# Patient Record
Sex: FEMALE | Race: WHITE | NOT HISPANIC OR LATINO | Employment: OTHER
[De-identification: names, ages, dates, MRNs, and addresses within clinical notes are randomized per-mention and may not be internally consistent; named-entity substitution may affect disease eponyms.]

---

## 2017-11-14 ENCOUNTER — RECORDS - HEALTHEAST (OUTPATIENT)
Dept: ADMINISTRATIVE | Facility: OTHER | Age: 67
End: 2017-11-14

## 2017-12-04 ENCOUNTER — AMBULATORY - HEALTHEAST (OUTPATIENT)
Dept: BEHAVIORAL HEALTH | Facility: CLINIC | Age: 67
End: 2017-12-04

## 2017-12-04 DIAGNOSIS — I10 HYPERTENSION: ICD-10-CM

## 2017-12-04 DIAGNOSIS — R41.3 MEMORY LOSS: ICD-10-CM

## 2018-01-05 ENCOUNTER — AMBULATORY - HEALTHEAST (OUTPATIENT)
Dept: BEHAVIORAL HEALTH | Facility: CLINIC | Age: 68
End: 2018-01-05

## 2018-01-05 DIAGNOSIS — I10 HYPERTENSION: ICD-10-CM

## 2018-01-05 DIAGNOSIS — G30.9 MILD NEUROCOGNITIVE DISORDER DUE TO ALZHEIMER'S DISEASE (H): ICD-10-CM

## 2018-01-05 DIAGNOSIS — F06.70 MILD NEUROCOGNITIVE DISORDER DUE TO ALZHEIMER'S DISEASE (H): ICD-10-CM

## 2018-01-05 DIAGNOSIS — R41.3 MEMORY LOSS: ICD-10-CM

## 2019-02-12 ENCOUNTER — AMBULATORY - HEALTHEAST (OUTPATIENT)
Dept: BEHAVIORAL HEALTH | Facility: CLINIC | Age: 69
End: 2019-02-12

## 2019-02-12 DIAGNOSIS — I10 HYPERTENSION: ICD-10-CM

## 2019-02-12 DIAGNOSIS — R41.3 MEMORY LOSS: ICD-10-CM

## 2019-02-12 DIAGNOSIS — G31.84 MCI (MILD COGNITIVE IMPAIRMENT): ICD-10-CM

## 2019-03-25 ENCOUNTER — AMBULATORY - HEALTHEAST (OUTPATIENT)
Dept: BEHAVIORAL HEALTH | Facility: CLINIC | Age: 69
End: 2019-03-25

## 2019-03-25 DIAGNOSIS — G30.9 MILD NEUROCOGNITIVE DISORDER DUE TO ALZHEIMER'S DISEASE (H): ICD-10-CM

## 2019-03-25 DIAGNOSIS — F06.70 MILD NEUROCOGNITIVE DISORDER DUE TO ALZHEIMER'S DISEASE (H): ICD-10-CM

## 2019-03-25 DIAGNOSIS — R41.3 MEMORY LOSS: ICD-10-CM

## 2019-03-25 DIAGNOSIS — G31.84 MCI (MILD COGNITIVE IMPAIRMENT): ICD-10-CM

## 2019-03-25 DIAGNOSIS — I10 HYPERTENSION: ICD-10-CM

## 2020-09-29 ENCOUNTER — AMBULATORY - HEALTHEAST (OUTPATIENT)
Dept: BEHAVIORAL HEALTH | Facility: CLINIC | Age: 70
End: 2020-09-29

## 2020-09-29 DIAGNOSIS — G31.84 MILD COGNITIVE IMPAIRMENT: ICD-10-CM

## 2020-09-29 DIAGNOSIS — R41.3 MEMORY LOSS: ICD-10-CM

## 2021-05-27 NOTE — PROGRESS NOTES
Neuropsychological Evaluation            Name: Beth Ng                          Date of Evaluation: 03/25/2019                  Education: 12 years          Date of Birth (Age): 1950 (69)      REFERRAL QUESTION:   Referred by Reg Jasso MD, for evaluation of cognitive functioning secondary patient complaints of cognitive difficulties and memory problems, repeat evaulation.      This report was prepared by Jean Figueroa, Ph.D., [facility], Board Certified Clinical Neuropsychologist.     BACKGROUND: The following report was taken from an interview with the patient and her family ( and daughter), a review of her existing records, and the current neuropsychological evaluation. Ms. Ng is a 67-year-old, right-handed,  female patient with a medical and psychiatric history that includes hyperlipidemia, hypertension, and arthritis.    The summary and recommendations will be discussed at the beginning of the report, with an extended report and history following, with more detailed results at the end of the document.    SUMMARY AND RECOMMENDATIONS    At this time, Ms. Ng s neuropsychological profile is notable for impairments in free recall memory in a generally amnestic profile. Specifically, she shows deficits in free recall of information, both verbal and visual, with little help from recognition cueing, suggestive of impaired encoding processes. Compared to previous testing, verbal memory was slightly declined to unchanged, while declines were noted in visual memory, some aspects of executive functioning, and semantic retrieval and fluency. During clinical interview, significant difficulties in activities of daily living were generally denied by herself and her partner.     Considering the current evaluation and change from the initial evaluation in 1/2018, a diagnosis of Minor Neurocognitive, Alzheimer s etiology appears likely given prominent amnestic patterns with accompanying  deficits in semantic retrieval and fluency. With report of generally intact i/ADL s, a Mild versus Major diagnosis is still in place, if i/ADLs become impaired, a diagnosis of Major Neurocognitive Disorder should be used.     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        IMPRESSIONS AND RECOMMENDATIONS:    1. During the current assessment, Ms. Ng s performance was notable for free recall deficits as well as some problems with semantic retrieval. This pattern is consistent with what may be expected from am amnestic cognitive profile. Her neurocognitive profile has experienced some declines as noted in the areas of visual memory, executive functioning, and semantic retrieval and fluency.     2. Ms. Ng and her partner will be sent materials regarding compensatory mechanisms.    3. Ms. Ng was encouraged to stay active, both mentally and physically, as this can help individuals maintain cognitive health longer than those who do not remain active.     4. Ms. Ng will be sent materials and resources regarding the local Alzheimer s [employer], which holds resources for individuals suffering from a variety of cognitive complaints. They were encouraged to explore these resources to learn what may be available in their local community should they ever need assistance with care and long-term planning.     5. We discussed the role of medication in diagnoses of cognitive impairment and dementia. If they wish to pursue medication options, they were further encouraged to speak with their primary care provider.     Thank you for including me in the care of this patient.    Jean Figueroa, Ph.D., [facility]  Board Certified Clinical Neuropsychologist  [phone]  [email or web address]    _______________Extended Report_____________________    RELEVANT MEDICAL HISTORY    Mr. Ng was previously seen by this provider for an initial neuropsychological evaluation at the  request of Dr. Jasso. The current evaluation will focus on change in the interval from the previous evaluation. For a more detailed history, please see the initial evaluation.      Briefly, the previous neuropsychological evaluation concluded  At this time Ms. Berenice garcia neuropsychological profile is notable for impairments in free recall memory in a generally amnestic profile. Specifically, she shows deficits in free recall of information, both verbal and visual, with little help from recognition cueing. This suggests impaired encoding processes. In addition, she is showing a pattern of semantic retrieval difficulties, impaired confrontation naming and lowered semantic vs phonemic fluency, that is also characteristic of amnestic profiles and later conversion to dementia in some individuals. Other domains assessed were generally intact. Additionally, at this time, both self and collateral informant report suggest activities of daily living to be intact.      Considering clinical reports and the current neuropsychological profile and observed pattern of deficits, a diagnosis of mild neurocognitive disorder appears warranted at this time. If she begins to experience deficits in other areas of cognitive functioning and difficulties in managing activities of daily living, a diagnosis of Major Neurocognitive Disorder, Alzheimer s Etiology should be considered.     Most recent labs available for review (11/7/2018) are notable for high total cholesterol (228), high triglycerides (167), and high glucose (103).     Please see the electronic record for a full review of Ms. Ng s medical history.    PSYCHOSOCIAL HISTORY  Ms. Ng currently lives with her spouse in [place], [place]. She reported that she graduated from high school and denied the presence of attention or learning difficulties in school.     Regarding family medical history, she reported that the family suspected that her mother had dementia and passed away in her  80 s. Ms. Ng was not aware of when the memory problems started. She also reported that her father suffered from heart disease. She believes one of her uncles may also have had memory problems, but otherwise denied knowledge of other neurological conditions in the family.     Ms. Ng reported that she quit smoking cigarettes more than 15 years ago. She denied the current or past use of illicit drugs. She reported that her current alcohol consumption was  variable  and that she may have a  couple of drinks most nights.     PRESENTING CONCERNS  Ms. Ng presented to the current evaluation reporting that she did not believe that things had changed much over the past 15 months. Her partner reported that she will repeat conversations more often, and ask the same questions more often. He reported that remote memory was intact, but that she has a difficult time with new information. He reported that he has not noticed much of a change in her day-to-day routines around the home, such as cooking, laundry, or cleaning.     Functionally, she is managing her medications/supplements with the aid of a pillbox with no reported problems at home. Her partner did report that they had gone out of town in November of 2018, during which she had significant problems managing her medications and was mixing up dosages. She was also on steroids and opiates for pain at this time for an injury, which possibly contributed to some confusion. She is managing her own finances with no reported issues. She has limited her driving to local places due to not feeling comfortable on the highway or in driving in unfamiliar places. Changes or difficulties in activities of self-care or grooming were denied at this time.     PHYSICAL AND EMOTIONAL FUNCTIONING  Ms. Ng reported that her hearing and vision have been relatively stable in recent years. Weight has been reportedly stable. She reported sleep has been restful and stable in recent  years. She denied any problems with balance or coordination. She denied any problems with headaches. She reported some word-finding problems, but otherwise denied any changes in speech    Emotionally, she reported that her mood has been stable. Her partner agreed and denied any personality changes. She largely denied depression and anxiety. She expressly denied suicidal ideation.     BEHAVIORAL OBSERVATIONS:  Ms. Ng arrived on time for the assessment, accompanied by her partner. She was dressed casually, appropriately groomed, and appeared her stated age.  She ambulated independently and with no obvious sign of gait abnormality. She appeared oriented to person place and time and understood the purpose of the evaluation.  Rate, rhythm, and prosody of speech were within normal limits and language was conversationally intact. No significant stuttering or word-findings problems were observed during interview. Her affect was full and mood appeared euthymic, although at times she did appear to be quite anxious, especially when she felt that she was not performing well on testing. She did repeat herself several times during the testing. There was no behavioral indication of obsessions, hallucinations, or delusional thinking during the evaluation.  The patient was pleasant and cooperative with the examiner.    TESTS ADMINISTERED:  Clinical Interview; Validated measures of effort; Wechsler Adult Intelligence Scale- 4th Edition (WAIS-IV, similarities, coding, symbol search, and digit span subtests only); Penn Verbal Learning Test (HVLT; Form 4); Wechsler Memory Scale - 4th Edition (WMS-IV, Logical Memory); Brief Visuospatial Memory Test (BVMT-R; Form 4); Trail Making Tests A & B (TMT A & B); [place] Naming Test (BNT); Phonemic Fluency (FAS); Semantic Fluency (Animal Naming);  Judgement of Line Orientation; Darren Osterrieth Complex Figure (Copy); Clock Drawing; Dementia Rating Scale - 2nd Edition (DRS-2); Geriatric  Depression Inventory    Billing and Documentation:   Time spent in neurobehavioral exam was approximately 45 minutes for 1 unit of 53128. Time spent administering and scoring tests was approximately 175 minutes (1 unit of 56773, 5 units of 10992). Time spent in integration of patient data, interpretation of standardized test results and clinical data, clinical decision making, treatment planning, report writing, and feedback to patient was approximately 155 minutes (1 unit of 48378, 2 units of 02257) Interview and evaluation were performed on 3/25/2019.    Interpretation   91%+    - Superior  75-90% - High Average  26-74% - Average  16-25% - Low Average  3-15%   - Mildly Impaired  1-2%     - Moderately Impaired  <1%      - Severely Impaired    RESULTS:     MEMORY  On a measure of memory, for verbally presented, word list information, she performed in the mildly impaired range for immediate recall of the information across learning trials with a flat learning curve (5,6,6). Her delayed free recall of the information was severely impaired (recalled 1 word). Her recognition cueing for this information was severely impaired (6/12 correct, 2 false positives). On a measure of verbally presented story information, she performed in the moderately impaired range for immediate recall of the information. Her delayed free recall of this information was severely impaired (recalled one story element across two stories after cueing for each) . Her recognition cueing of this information was well below expectation (11/23 correct). Her recall of visually presented figural information was severely impaired for both immediate and delayed free recall. Her recognition cueing of this information was impaired (2/6 hits, 2 false positives).     ATTENTION/WORKING MEMORY/PROCESSING SPEED  Ms. Berenice garcia performance on measures of graphomotor processing speed was average. Her performance on a measure of speeded visual scanning was superior.  Verbal auditory attention was low average.     VISUOSPATIAL/VISUOCONSTRUCTIONAL  Her performance on a task of visuospatial line judgment was mildly impaired. Her copy of a complex figure was impaired and notable for poor planning and figure aspects out of proportion and misplaced at times. Her clock drawing was mildly impaired and notable for poor number spacing at times, but with correct hand placement.     LANGUAGE  She performed in the mildly impaired range on a measure of confrontation naming. Phonemic fluency was average, while semantic fluency (Animals) was moderately impaired.     EXECUTIVE FUNCTIONING  On a task of speeded visual scanning with set shifting, she performed in the low average range. Her performance on a task of verbal abstract reasoning was low average.     MOOD  On a self-report instrument of symptoms of depression, she endorsed symptoms in the minimal range.     DEMENTIA RATING SCALE  Ms. Ng was administered the Dementia Rating Scale - 2. She performed within normal limits on the Attention, Construction, and Conceptualization scales. Her performance on Initiation/Perseveration was mildly impaired, and her Memory performance was severely impaired.     Herber Ng                      Educ: 12   Age: 69   Sex: F                                       Raw 3/25/2019  TEST                              2018  Raw   SS  WAIS-IV    Similarities                     23    18    8    Digit Span                       23    21    8  WAIS-IV Processing Speed                 22   105    Symbol Search                    16    25   11    Coding                           62    57   11    MEMORY - VERBAL  WMS-IV Logical Memory    Immediate Recall                 14    12    3    Delayed Recall                    0    1     2    Delayed Recognition (/23)        13    11  Penn VLT - R (Form:)                  4    Immediate Recall                 18    18   39T    Delayed Recall                    1    " 1    21T    Delayed Discrimination            6    4    MEMORY - VISUAL  Brief Vis. Mem. Test - R (Form:)         4    Immediate Recall                 10    4    21T    Delayed Recall                    3    1   <20T    Delayed Discrimination            4    2   71-76    LANGUAGE  [place] Naming (60-Item)            46    36   32T    EXECUTIVE FUNCTIONS  Trails: A                          24   21\"   68T  Trails: B                          59   144\"  41T  Phonemic Fluency (COWA)            47    42   54T  Animal Naming                      17    10   28T    VISUOSPATIAL  Clock Draw                              WNL  Judgment of Line Orientation       13    14    6  Darren Copy                                Imp.  Darren Time to Copy                        137\"    MOOD & PERSONALITY  Geriatric Depression Inventory           5    DRS-2                                   Raw  AMSS    Attention                              36   11    Initiation/Perseveration               32    6    Construction                           6    10    Conceptualization                      36   10    Memory                                 14    2  "

## 2021-05-31 ENCOUNTER — RECORDS - HEALTHEAST (OUTPATIENT)
Dept: ADMINISTRATIVE | Facility: CLINIC | Age: 71
End: 2021-05-31

## 2021-06-15 NOTE — PROGRESS NOTES
Neuropsychological Evaluation            Name: Beth Ng                          Date of Evaluation: 01/05/2018                  Education: 12 years          Date of Birth (Age): 1950 (67)      REFERRAL QUESTION:   Referred by Reg Jasso MD, for evaluation of cognitive functioning secondary patient complaints of cognitive difficulties and memory problems.      This report was prepared by Jean Figueroa, Ph.D., [facility], Board Certified Clinical Neuropsychologist.     BACKGROUND: The following report was taken from an interview with the patient and her family ( and daughter), a review of her existing records, and the current neuropsychological evaluation. Ms. Ng is a 67-year-old, right-handed,  female patient with a medical and psychiatric history that includes hyperlipidemia, hypertension, and arthritis.    The results and recommendations will be discussed at the beginning of the report, with an extended report and history following.    RESULTS    Ms. Ng s premorbid cognitive functioning is estimated to be in the low average to average range based on demographic factors and performance on a test of single word reading.     The current examination revealed impaired memory functioning. On a measure of verbally presented, thematically organized story information, she performed in the moderately impaired range for immediate recall of the information and in the severely impaired range for delayed recall of the information (recalled 0 story elements). On recognition cueing for this information, she performed in the moderately to severely impaired range (13/23 correct). On a verbally presented test of word list learning she performed in the mildly impaired range for recall of the word list across learning trials. Her free recall of the word list after a delay was moderately impaired (recalled 1 word. Recognition cueing of the word list was below expectation (9/12 hits, 3 false  positives). On a task of visual memory, she performed in the mildly impaired range for immediate recall of the information across learning trials. Her delayed recall of this information was moderately impaired. Her recognition cueing of the information was slightly improved (5/6 hits, 1 false positive).     She performed in the high average range on measures of speeded visual scanning, speeded visual scanning with set-shifting, and phonemic fluency. She performed in the average range on measures of verbal abstract reasoning, verbal attention, processing speed requiring transcription of numbers and symbols, and semantic fluency. She performed in the low average range on a measure of visuospatial reasoning. She performed in the mildly impaired range on measures of processing speed requiring of matching symbols amidst distractors, confrontation naming, and visuospatial line judgment.     Regarding her neuropsychiatric status, she endorsed minimal symptoms of depression and anxiety on self-report measures.     At this time Ms. Ng s neuropsychological profile is notable for impairments in free recall memory in a generally amnestic profile. Specifically, she shows deficits in free recall of information, both verbal and visual, with little help from recognition cueing. This suggests impaired encoding processes. In addition, she is showing a pattern of semantic retrieval difficulties, impaired confrontation naming and lowered semantic vs phonemic fluency, that is also characteristic of amnestic profiles and later conversion to dementia in some individuals. Other domains assessed were generally intact. Additionally, at this time, both self and collateral informant report suggest activities of daily living to be intact.     Considering clinical reports and the current neuropsychological profile and observed pattern of deficits, a diagnosis of mild neurocognitive disorder appears warranted at this time. If she begins to  experience deficits in other areas of cognitive functioning and difficulties in managing activities of daily living, a diagnosis of Major Neurocognitive Disorder, Alzheimer s Etiology should be considered.     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        IMPRESSIONS AND RECOMMENDATIONS:    1. During the current assessment, Ms. Ng s performance was notable for free recall deficits as well as some problems with semantic retrieval. This pattern is consistent with what may be expected from am amnestic cognitive profile.     2. Ms. Ng and her family were given handouts regarding compensatory cognitive strategies to maximize available cognitive reserve and maximize quality of life. These strategies were also discussed within the feedback session at the end of the evaluation.     3. Ms. Ng was encouraged to stay active, both mentally and physically, as this can help individuals maintain cognitive health longer than those who do not remain active.     4. Ms. Ng reported consuming alcohol most nights of the week, between 2-4 drinks on occasion. It was also reported that her cognition was noticeably worse after several drinks. She was encouraged to decrease her alcohol consumption as heavier levels of consumption may hasten cognitive decline and physical health issues.     5. Ms. Ng and her family were given handouts and resources regarding the local Alzheimer s [employer], which holds resources for individuals suffering from a variety of cognitive complaints. They were encouraged to explore these resources to learn what may be available in their local community should they ever need assistance with care and long-term planning.     6. We discussed the role of medication in diagnoses of cognitive impairment and dementia. If they wish to pursue medication options, they were further encouraged to speak with their primary care provider.     7. The current testing  may act as a baseline of cognitive functioning. It is recommended that she return for an evaluation in 12-18 months  time to assess the stability of her cognitive profile.     8. Feedback about his performances was given to Ms. Ng and her family the same day as his evaluation and no future appointment is necessary at this time. The results and recommendations were explained to them and they were given a chance to ask questions. Contact information was provided should they have questions about the evaluation in the future.     Thank you for including me in the care of this patient.    Jean Figueroa, Ph.D., [facility]-  Board Certified Clinical Neuropsychologist  [phone]  [email or web address]    _______________Extended Report_____________________    RELEVANT MEDICAL HISTORY    Mr. Ng recently presented to the [facility] clinic on 11/14/2017 for complaints of memory problems. The general neurological exam was generally within normal limits although she scored 22/30 on the MMSE at that time.     A recent MRI of the brain (11/14/2017) reported  Mild cerebral and cerebellar atrophy. Mild supratentorial white matter changes that are non-specific, but statistically most likely related to small vessel ischemic disease. Apparent mild thickening of the dura overlying the left cerebral hemisphere. This may present residua from distant inflammatory process or potentially a very thin chronic subdural hematoma.      Chart notes from [facility] indicate that labs were drawn for BMP, CBC, TSH, B12, and sedimentation rate, with no labs in a range suggestive of cognitive impairment from a reversible cause.     Please see the electronic record for a full review of Ms. Ng s medical history.    PSYCHOSOCIAL HISTORY  Ms. Ng currently lives with her spouse in [place], [place]. She reported that she graduated from high school and denied the presence of attention or learning difficulties in school. 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     Regarding family medical history, she reported that the family suspected that her mother had dementia and passed away in her 80 s. Ms. Ng was not aware of when the memory problems started. She also reported that her father suffered from heart disease. She believes one of her uncles may also have had memory problems, but otherwise denied knowledge of other neurological conditions in the family.     Ms. Ng reported that she quit smoking cigarettes approximately 15 years ago. She denied the current or past use of illicit drugs. She reported that she drinks 3-4 drinks an evening most days of the week. She reported that about once a month or so she will have 6 or more drinks on an occasion.     PRESENTING CONCERNS  Ms. Ng presented to the current evaluation with a primary complaint of memory and word-finding problems. Specifically, she reported that she has had to write things down much more frequently in order to remember. Both she and her accompanying family members reported that she will often repeat things that she has already said to them throughout the day. She noted some mild problems with misplacing things, but did not feel that it was a big problem. She also reported that she cannot focus for lengths of time, making it difficult to do things such as sit down to read a book. She and her family feel that these issues became noticeable over the last 2-3 years and have slowly been getting worse.     Functionally, she is managing her medications/supplements with the aid of a pillbox with no reported problems. She also reported that she handles the household finances with no reported difficulties. She is still driving, although she has limited her driving somewhat over the past year. She denied any accidents or tickets, but did report that she has to think harder about directions when  she drives now. She denied any changes or difficulties in activities of self-care or grooming. Her  and daughter agreed with the above report regarding I/ADLS.     PHYSICAL AND EMOTIONAL FUNCTIONING  Ms. Ng reported that her hearing and vision have been relatively stable in recent years. Weight has been reportedly stable. She reported that she uses melatonin as a sleep aid, but that sleep has been restful and stable in recent years. She reported some lightheadedness that coincides with a recent medication change but feels that it is a minor problem and denied any falls associated with it. She denied any problems with headaches.     Emotionally, she reported that she is  usually in a pretty good mood.  She largely denied depression and anxiety. She expressly denied suicidal ideation.     BEHAVIORAL OBSERVATIONS:  Ms. Ng arrived on time for the assessment, accompanied by her spouse and daughter. She was dressed casually, appropriately groomed, and appeared his stated age.  She ambulated independently and with no obvious sign of gait abnormality. She appeared oriented to person place and time and understood the purpose of the evaluation.  Rate, rhythm, and prosody of speech were within normal limits and language was conversationally intact. No significant stuttering or word-findings problems were observed during interview. Her affect was full and mood appeared euthymic, although at times she did appear to be quite anxious, especially when she felt that she was not performing well on testing. There was no behavioral indication of obsessions, hallucinations, or delusional thinking during the evaluation.  The patient was pleasant and cooperative with the examiner.    TESTS ADMINISTERED:  Clinical Interview; Validated measures of effort; Wechsler Test of Adult Reading (WTAR); Wechsler Adult Intelligence Scale- 4th Edition (WAIS-IV, block design, similarities, coding, symbol search, and digit span subtests  only); Penn Verbal Learning Test (HVLT; Form 1); Wechsler Memory Scale - 4th Edition (WMS-IV, Logical Memory); Brief Visuospatial Memory Test (BVMT-R; Form 1); Trail Making Tests A & B (TMT A & B); [place] Naming Test (BNT); Phonemic Fluency (FAS); Semantic Fluency (Animal Naming);  Judgement of Line Orientation; Darren Osterrieth Complex Figure (Copy); Sorenson Depression Inventory, Sorenson Anxiety Inventory    Billing and Documentation:   The current evaluation consisted of 1 unit of 31469 spent in neurobehavioral status examination and 8 hours of time spent in chart review, test administration by neuropsychologist, scoring of tests, integration/interpretation, gathering of collateral information, feedback of results, and report writing) for 8 units of 59345.     Interpretation   91%+    - Superior  75-90% - High Average  26-74% - Average  16-25% - Low Average  3-15%   - Mildly Impaired  1-2%     - Moderately Impaired  <1%      - Severely Impaired    Name: Beth Ng               Educ: 12   Age: 67   Sex: F                                    1/5/2018  TEST                             Raw   SS  WAIS-IV    Similarities                    23    9    Block Design                    24    8    Digit Span                      23    9  WAIS-IV Processing Speed          17   92    Symbol Search                   16    6    Coding                          62   11    WTAR                              21   83  WTAR Pred FSIQ                         89    MEMORY - VERBAL  WMS-IV Logical Memory    Immediate Recall                14    4    Delayed Recall                  0     1    Delayed Recognition (/30)       13  Penn VLT - R (Form:)           1    Immediate Recall                18   39T    Delayed Recall                  1    21T    Delayed Discrimination          6    25T    Rec Hits                       9/12    Rec False Pos.                  3    MEMORY - VISUAL  Brief Vis. Mem. Test - R (Form:)  1    Immediate Recall  "               10   30T    Delayed Recall                  3    28T    Delayed Discrimination          4   82-85    LANGUAGE  [place] Naming (60-Item)           46   38T    EXECUTIVE FUNCTIONS  Trails: A                        24\"   62T  Trails: B                        59\"   60T  Phonemic Fluency (FAS)            47   57T  Animal Naming                     17   48T    VISUOSPATIAL  Judgment of Line Orientation      13    MOOD & PERSONALITY  Sorenson Depression Inventory-II      12  Sorenson Anxiety Inventory            9  "

## 2021-09-08 ENCOUNTER — DOCUMENTATION ONLY (OUTPATIENT)
Dept: NEUROLOGY | Facility: CLINIC | Age: 71
End: 2021-09-08

## 2021-09-08 NOTE — PROGRESS NOTES
Neuropsychology Note     Ms. Ng was originally referred for a neuropsychological evaluation on 9/28/2020 by Dr. Jasso. The original order was sent to Jean Figueroa MD, at [facility]. Due to the ongoing COVID-19 pandemic that prevented in-person visits, Dr. Figueroa elected to wait to see Ms. Ng for an in-person evaluation given the nature of the patient's symptoms/history. However, Dr. Figueroa is no longer working for the [facility] system and his wait-list was passed on to our service at [facility]. Now that we are able to accommodate in-person visits again, our schedulers reached out her to schedule an evaluation with us. However, the patient reported that she is no longer interested.      Ms. Ng is welcome to contact our service at any time ([phone]) should she wish to schedule this evaluation in the future.

## 2021-10-16 ENCOUNTER — HEALTH MAINTENANCE LETTER (OUTPATIENT)
Age: 71
End: 2021-10-16

## 2022-01-01 ENCOUNTER — HEALTH MAINTENANCE LETTER (OUTPATIENT)
Age: 72
End: 2022-01-01

## 2023-01-01 ENCOUNTER — HOSPITAL ENCOUNTER (INPATIENT)
Facility: CLINIC | Age: 73
LOS: 6 days | Discharge: NURSING, REHAB OR LONG-TERM CARE | DRG: 640 | End: 2023-04-19
Attending: EMERGENCY MEDICINE | Admitting: HOSPITALIST
Payer: MEDICARE

## 2023-01-01 ENCOUNTER — HEALTH MAINTENANCE LETTER (OUTPATIENT)
Age: 73
End: 2023-01-01

## 2023-01-01 ENCOUNTER — APPOINTMENT (OUTPATIENT)
Dept: CT IMAGING | Facility: CLINIC | Age: 73
DRG: 640 | End: 2023-01-01
Attending: EMERGENCY MEDICINE
Payer: MEDICARE

## 2023-01-01 ENCOUNTER — DOCUMENTATION ONLY (OUTPATIENT)
Dept: OTHER | Facility: CLINIC | Age: 73
End: 2023-01-01

## 2023-01-01 ENCOUNTER — PATIENT OUTREACH (OUTPATIENT)
Dept: CARE COORDINATION | Facility: CLINIC | Age: 73
End: 2023-01-01
Payer: COMMERCIAL

## 2023-01-01 VITALS
OXYGEN SATURATION: 92 % | TEMPERATURE: 97.2 F | DIASTOLIC BLOOD PRESSURE: 68 MMHG | HEART RATE: 58 BPM | WEIGHT: 120.81 LBS | RESPIRATION RATE: 18 BRPM | SYSTOLIC BLOOD PRESSURE: 111 MMHG

## 2023-01-01 DIAGNOSIS — R45.1 AGITATION: Primary | ICD-10-CM

## 2023-01-01 DIAGNOSIS — F02.C11: ICD-10-CM

## 2023-01-01 DIAGNOSIS — E87.0 HYPERNATREMIA: ICD-10-CM

## 2023-01-01 DIAGNOSIS — G30.8: ICD-10-CM
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
## 2023-01-01 DIAGNOSIS — S02.2XXA CLOSED FRACTURE OF NASAL BONE, INITIAL ENCOUNTER: ICD-10-CM

## 2023-01-01 LAB
ALBUMIN UR-MCNC: 10 MG/DL
ANION GAP SERPL CALCULATED.3IONS-SCNC: 17 MMOL/L (ref 5–18)
ANION GAP SERPL CALCULATED.3IONS-SCNC: 17 MMOL/L (ref 5–18)
ANION GAP SERPL CALCULATED.3IONS-SCNC: 8 MMOL/L (ref 5–18)
APPEARANCE UR: CLEAR
BASOPHILS # BLD AUTO: 0 10E3/UL (ref 0–0.2)
BASOPHILS NFR BLD AUTO: 0 %
BILIRUB UR QL STRIP: NEGATIVE
BUN SERPL-MCNC: 35 MG/DL (ref 8–28)
BUN SERPL-MCNC: 45 MG/DL (ref 8–28)
BUN SERPL-MCNC: 46 MG/DL (ref 8–28)
CALCIUM SERPL-MCNC: 8.2 MG/DL (ref 8.5–10.5)
CALCIUM SERPL-MCNC: 8.7 MG/DL (ref 8.5–10.5)
CALCIUM SERPL-MCNC: 8.9 MG/DL (ref 8.5–10.5)
CHLORIDE BLD-SCNC: 104 MMOL/L (ref 98–107)
CHLORIDE BLD-SCNC: 107 MMOL/L (ref 98–107)
CHLORIDE BLD-SCNC: 108 MMOL/L (ref 98–107)
CO2 SERPL-SCNC: 29 MMOL/L (ref 22–31)
CO2 SERPL-SCNC: 30 MMOL/L (ref 22–31)
CO2 SERPL-SCNC: 30 MMOL/L (ref 22–31)
COLOR UR AUTO: YELLOW
CREAT SERPL-MCNC: 0.96 MG/DL (ref 0.6–1.1)
CREAT SERPL-MCNC: 0.98 MG/DL (ref 0.6–1.1)
CREAT SERPL-MCNC: 1.78 MG/DL (ref 0.6–1.1)
CREAT SERPL-MCNC: 1.84 MG/DL (ref 0.6–1.1)
EOSINOPHIL # BLD AUTO: 0.1 10E3/UL (ref 0–0.7)
EOSINOPHIL NFR BLD AUTO: 1 %
ERYTHROCYTE [DISTWIDTH] IN BLOOD BY AUTOMATED COUNT: 15.4 % (ref 10–15)
ERYTHROCYTE [DISTWIDTH] IN BLOOD BY AUTOMATED COUNT: 16.1 % (ref 10–15)
GFR SERPL CREATININE-BSD FRML MDRD: 28 ML/MIN/1.73M2
GFR SERPL CREATININE-BSD FRML MDRD: 29 ML/MIN/1.73M2
GFR SERPL CREATININE-BSD FRML MDRD: 60 ML/MIN/1.73M2
GFR SERPL CREATININE-BSD FRML MDRD: 61 ML/MIN/1.73M2
GLUCOSE BLD-MCNC: 102 MG/DL (ref 70–125)
GLUCOSE BLD-MCNC: 110 MG/DL (ref 70–125)
GLUCOSE BLD-MCNC: 117 MG/DL (ref 70–125)
GLUCOSE UR STRIP-MCNC: NEGATIVE MG/DL
HCT VFR BLD AUTO: 30.6 % (ref 35–47)
HCT VFR BLD AUTO: 38.7 % (ref 35–47)
HGB BLD-MCNC: 11.6 G/DL (ref 11.7–15.7)
HGB BLD-MCNC: 8.5 G/DL (ref 11.7–15.7)
HGB BLD-MCNC: 9.2 G/DL (ref 11.7–15.7)
HGB BLD-MCNC: 9.7 G/DL (ref 11.7–15.7)
HGB UR QL STRIP: NEGATIVE
HYALINE CASTS: 13 /LPF
IMM GRANULOCYTES # BLD: 0 10E3/UL
IMM GRANULOCYTES NFR BLD: 0 %
KETONES UR STRIP-MCNC: NEGATIVE MG/DL
LEUKOCYTE ESTERASE UR QL STRIP: NEGATIVE
LYMPHOCYTES # BLD AUTO: 1 10E3/UL (ref 0.8–5.3)
LYMPHOCYTES NFR BLD AUTO: 12 %
MCH RBC QN AUTO: 30.9 PG (ref 26.5–33)
MCH RBC QN AUTO: 31.1 PG (ref 26.5–33)
MCHC RBC AUTO-ENTMCNC: 30 G/DL (ref 31.5–36.5)
MCHC RBC AUTO-ENTMCNC: 31.7 G/DL (ref 31.5–36.5)
MCV RBC AUTO: 103 FL (ref 78–100)
MCV RBC AUTO: 98 FL (ref 78–100)
MONOCYTES # BLD AUTO: 1.1 10E3/UL (ref 0–1.3)
MONOCYTES NFR BLD AUTO: 13 %
MUCOUS THREADS #/AREA URNS LPF: PRESENT /LPF
NEUTROPHILS # BLD AUTO: 6.6 10E3/UL (ref 1.6–8.3)
NEUTROPHILS NFR BLD AUTO: 74 %
NITRATE UR QL: NEGATIVE
NRBC # BLD AUTO: 0 10E3/UL
NRBC BLD AUTO-RTO: 0 /100
PH UR STRIP: 5 [PH] (ref 5–7)
PLATELET # BLD AUTO: 204 10E3/UL (ref 150–450)
PLATELET # BLD AUTO: 235 10E3/UL (ref 150–450)
POTASSIUM BLD-SCNC: 3.9 MMOL/L (ref 3.5–5)
POTASSIUM BLD-SCNC: 4.3 MMOL/L (ref 3.5–5)
POTASSIUM BLD-SCNC: 4.4 MMOL/L (ref 3.5–5)
RBC # BLD AUTO: 3.12 10E6/UL (ref 3.8–5.2)
RBC # BLD AUTO: 3.75 10E6/UL (ref 3.8–5.2)
RBC URINE: <1 /HPF
SODIUM SERPL-SCNC: 141 MMOL/L (ref 136–145)
SODIUM SERPL-SCNC: 142 MMOL/L (ref 136–145)
SODIUM SERPL-SCNC: 143 MMOL/L (ref 136–145)
SODIUM SERPL-SCNC: 143 MMOL/L (ref 136–145)
SODIUM SERPL-SCNC: 144 MMOL/L (ref 136–145)
SODIUM SERPL-SCNC: 145 MMOL/L (ref 136–145)
SODIUM SERPL-SCNC: 145 MMOL/L (ref 136–145)
SODIUM SERPL-SCNC: 147 MMOL/L (ref 136–145)
SODIUM SERPL-SCNC: 149 MMOL/L (ref 136–145)
SODIUM SERPL-SCNC: 150 MMOL/L (ref 136–145)
SODIUM SERPL-SCNC: 153 MMOL/L (ref 136–145)
SODIUM SERPL-SCNC: 155 MMOL/L (ref 136–145)
SP GR UR STRIP: 1.02 (ref 1–1.03)
SQUAMOUS EPITHELIAL: 1 /HPF
UROBILINOGEN UR STRIP-MCNC: 2 MG/DL
WBC # BLD AUTO: 8.7 10E3/UL (ref 4–11)
WBC # BLD AUTO: 8.8 10E3/UL (ref 4–11)
WBC URINE: 1 /HPF

## 2023-01-01 PROCEDURE — 250N000011 HC RX IP 250 OP 636: Performed by: HOSPITALIST

## 2023-01-01 PROCEDURE — 36415 COLL VENOUS BLD VENIPUNCTURE: CPT | Performed by: HOSPITALIST

## 2023-01-01 PROCEDURE — 82565 ASSAY OF CREATININE: CPT | Performed by: STUDENT IN AN ORGANIZED HEALTH CARE EDUCATION/TRAINING PROGRAM

## 2023-01-01 PROCEDURE — 250N000013 HC RX MED GY IP 250 OP 250 PS 637: Performed by: STUDENT IN AN ORGANIZED HEALTH CARE EDUCATION/TRAINING PROGRAM

## 2023-01-01 PROCEDURE — 250N000013 HC RX MED GY IP 250 OP 250 PS 637: Performed by: INTERNAL MEDICINE

## 2023-01-01 PROCEDURE — 120N000001 HC R&B MED SURG/OB

## 2023-01-01 PROCEDURE — 36415 COLL VENOUS BLD VENIPUNCTURE: CPT | Performed by: EMERGENCY MEDICINE

## 2023-01-01 PROCEDURE — 250N000013 HC RX MED GY IP 250 OP 250 PS 637: Performed by: HOSPITALIST

## 2023-01-01 PROCEDURE — 99207 PR CDG-CUT & PASTE-POTENTIAL IMPACT ON LEVEL: CPT | Performed by: STUDENT IN AN ORGANIZED HEALTH CARE EDUCATION/TRAINING PROGRAM

## 2023-01-01 PROCEDURE — 85025 COMPLETE CBC W/AUTO DIFF WBC: CPT | Performed by: EMERGENCY MEDICINE

## 2023-01-01 PROCEDURE — 84295 ASSAY OF SERUM SODIUM: CPT | Performed by: HOSPITALIST

## 2023-01-01 PROCEDURE — 99291 CRITICAL CARE FIRST HOUR: CPT | Mod: 25

## 2023-01-01 PROCEDURE — 70450 CT HEAD/BRAIN W/O DYE: CPT

## 2023-01-01 PROCEDURE — 99233 SBSQ HOSP IP/OBS HIGH 50: CPT | Performed by: STUDENT IN AN ORGANIZED HEALTH CARE EDUCATION/TRAINING PROGRAM

## 2023-01-01 PROCEDURE — 70486 CT MAXILLOFACIAL W/O DYE: CPT | Mod: MG

## 2023-01-01 PROCEDURE — 99292 CRITICAL CARE ADDL 30 MIN: CPT

## 2023-01-01 PROCEDURE — 36415 COLL VENOUS BLD VENIPUNCTURE: CPT | Performed by: STUDENT IN AN ORGANIZED HEALTH CARE EDUCATION/TRAINING PROGRAM

## 2023-01-01 PROCEDURE — 99222 1ST HOSP IP/OBS MODERATE 55: CPT | Performed by: HOSPITALIST

## 2023-01-01 PROCEDURE — 99223 1ST HOSP IP/OBS HIGH 75: CPT | Performed by: NURSE PRACTITIONER

## 2023-01-01 PROCEDURE — 81001 URINALYSIS AUTO W/SCOPE: CPT | Performed by: INTERNAL MEDICINE

## 2023-01-01 PROCEDURE — 84295 ASSAY OF SERUM SODIUM: CPT | Performed by: STUDENT IN AN ORGANIZED HEALTH CARE EDUCATION/TRAINING PROGRAM

## 2023-01-01 PROCEDURE — 85018 HEMOGLOBIN: CPT | Performed by: STUDENT IN AN ORGANIZED HEALTH CARE EDUCATION/TRAINING PROGRAM

## 2023-01-01 PROCEDURE — 80048 BASIC METABOLIC PNL TOTAL CA: CPT | Performed by: HOSPITALIST

## 2023-01-01 PROCEDURE — 99239 HOSP IP/OBS DSCHRG MGMT >30: CPT | Performed by: STUDENT IN AN ORGANIZED HEALTH CARE EDUCATION/TRAINING PROGRAM

## 2023-01-01 PROCEDURE — 85014 HEMATOCRIT: CPT | Performed by: STUDENT IN AN ORGANIZED HEALTH CARE EDUCATION/TRAINING PROGRAM

## 2023-01-01 PROCEDURE — 258N000003 HC RX IP 258 OP 636: Performed by: HOSPITALIST

## 2023-01-01 PROCEDURE — 80048 BASIC METABOLIC PNL TOTAL CA: CPT | Performed by: EMERGENCY MEDICINE

## 2023-01-01 PROCEDURE — 99232 SBSQ HOSP IP/OBS MODERATE 35: CPT | Performed by: STUDENT IN AN ORGANIZED HEALTH CARE EDUCATION/TRAINING PROGRAM

## 2023-01-01 RX ORDER — OLANZAPINE 7.5 MG/1
7.5 TABLET, FILM COATED ORAL AT BEDTIME
COMMUNITY

## 2023-01-01 RX ORDER — DIVALPROEX SODIUM 125 MG/1
250 TABLET, DELAYED RELEASE ORAL EVERY MORNING
COMMUNITY

## 2023-01-01 RX ORDER — GABAPENTIN 100 MG/1
100 CAPSULE ORAL 3 TIMES DAILY
COMMUNITY

## 2023-01-01 RX ORDER — MEMANTINE HYDROCHLORIDE 5 MG/1
5 TABLET ORAL 2 TIMES DAILY
Status: DISCONTINUED | OUTPATIENT
Start: 2023-01-01 | End: 2023-01-01 | Stop reason: HOSPADM

## 2023-01-01 RX ORDER — OLANZAPINE 2.5 MG/1
2.5 TABLET, FILM COATED ORAL ONCE
Status: COMPLETED | OUTPATIENT
Start: 2023-01-01 | End: 2023-01-01

## 2023-01-01 RX ORDER — DOCUSATE SODIUM 100 MG/1
100 CAPSULE, LIQUID FILLED ORAL 2 TIMES DAILY PRN
Status: DISCONTINUED | OUTPATIENT
Start: 2023-01-01 | End: 2023-01-01 | Stop reason: HOSPADM

## 2023-01-01 RX ORDER — TRAZODONE HYDROCHLORIDE 50 MG/1
25 TABLET, FILM COATED ORAL AT BEDTIME
COMMUNITY
Start: 2023-01-01

## 2023-01-01 RX ORDER — DONEPEZIL HYDROCHLORIDE 10 MG/1
10 TABLET, FILM COATED ORAL AT BEDTIME
Status: DISCONTINUED | OUTPATIENT
Start: 2023-01-01 | End: 2023-01-01 | Stop reason: HOSPADM

## 2023-01-01 RX ORDER — LISINOPRIL 40 MG/1
40 TABLET ORAL DAILY
Status: DISCONTINUED | OUTPATIENT
Start: 2023-01-01 | End: 2023-01-01 | Stop reason: HOSPADM

## 2023-01-01 RX ORDER — ONDANSETRON 2 MG/ML
4 INJECTION INTRAMUSCULAR; INTRAVENOUS EVERY 6 HOURS PRN
Status: DISCONTINUED | OUTPATIENT
Start: 2023-01-01 | End: 2023-01-01 | Stop reason: HOSPADM

## 2023-01-01 RX ORDER — TRAZODONE HYDROCHLORIDE 50 MG/1
50 TABLET, FILM COATED ORAL AT BEDTIME
Status: ON HOLD | COMMUNITY
End: 2023-01-01

## 2023-01-01 RX ORDER — DEXTROSE MONOHYDRATE 50 MG/ML
INJECTION, SOLUTION INTRAVENOUS CONTINUOUS
Status: DISCONTINUED | OUTPATIENT
Start: 2023-01-01 | End: 2023-01-01

## 2023-01-01 RX ORDER — ACETAMINOPHEN 325 MG/1
650 TABLET ORAL EVERY 6 HOURS PRN
Status: DISCONTINUED | OUTPATIENT
Start: 2023-01-01 | End: 2023-01-01 | Stop reason: HOSPADM

## 2023-01-01 RX ORDER — BUSPIRONE HYDROCHLORIDE 10 MG/1
10 TABLET ORAL 3 TIMES DAILY
COMMUNITY

## 2023-01-01 RX ORDER — ACETAMINOPHEN 650 MG/1
650 SUPPOSITORY RECTAL EVERY 6 HOURS PRN
Status: DISCONTINUED | OUTPATIENT
Start: 2023-01-01 | End: 2023-01-01 | Stop reason: HOSPADM

## 2023-01-01 RX ORDER — BUSPIRONE HYDROCHLORIDE 10 MG/1
10 TABLET ORAL 3 TIMES DAILY
Status: DISCONTINUED | OUTPATIENT
Start: 2023-01-01 | End: 2023-01-01 | Stop reason: HOSPADM

## 2023-01-01 RX ORDER — OLANZAPINE 5 MG/1
2.5-5 TABLET ORAL EVERY 8 HOURS PRN
Status: ON HOLD | COMMUNITY
End: 2023-01-01

## 2023-01-01 RX ORDER — TRAZODONE HYDROCHLORIDE 50 MG/1
50 TABLET, FILM COATED ORAL AT BEDTIME
Status: DISCONTINUED | OUTPATIENT
Start: 2023-01-01 | End: 2023-01-01

## 2023-01-01 RX ORDER — LISINOPRIL 40 MG/1
40 TABLET ORAL DAILY
COMMUNITY

## 2023-01-01 RX ORDER — OLANZAPINE 2.5 MG/1
2.5 TABLET, FILM COATED ORAL EVERY 6 HOURS PRN
Status: DISCONTINUED | OUTPATIENT
Start: 2023-01-01 | End: 2023-01-01 | Stop reason: HOSPADM

## 2023-01-01 RX ORDER — FUROSEMIDE 40 MG
40 TABLET ORAL EVERY MORNING
COMMUNITY

## 2023-01-01 RX ORDER — PROCHLORPERAZINE 25 MG
12.5 SUPPOSITORY, RECTAL RECTAL EVERY 12 HOURS PRN
Status: DISCONTINUED | OUTPATIENT
Start: 2023-01-01 | End: 2023-01-01 | Stop reason: HOSPADM

## 2023-01-01 RX ORDER — DIVALPROEX SODIUM 250 MG/1
250 TABLET, DELAYED RELEASE ORAL EVERY MORNING
Status: DISCONTINUED | OUTPATIENT
Start: 2023-01-01 | End: 2023-01-01 | Stop reason: HOSPADM

## 2023-01-01 RX ORDER — OLANZAPINE 5 MG/1
5 TABLET ORAL 2 TIMES DAILY
Status: DISCONTINUED | OUTPATIENT
Start: 2023-01-01 | End: 2023-01-01

## 2023-01-01 RX ORDER — LIDOCAINE 40 MG/G
CREAM TOPICAL
Status: DISCONTINUED | OUTPATIENT
Start: 2023-01-01 | End: 2023-01-01 | Stop reason: HOSPADM

## 2023-01-01 RX ORDER — ESCITALOPRAM OXALATE 10 MG/1
10 TABLET ORAL EVERY MORNING
Status: DISCONTINUED | OUTPATIENT
Start: 2023-01-01 | End: 2023-01-01 | Stop reason: HOSPADM

## 2023-01-01 RX ORDER — ONDANSETRON 4 MG/1
4 TABLET, ORALLY DISINTEGRATING ORAL EVERY 6 HOURS PRN
Status: DISCONTINUED | OUTPATIENT
Start: 2023-01-01 | End: 2023-01-01 | Stop reason: HOSPADM

## 2023-01-01 RX ORDER — TRAZODONE HYDROCHLORIDE 50 MG/1
50 TABLET, FILM COATED ORAL 2 TIMES DAILY PRN
Status: ON HOLD | COMMUNITY
End: 2023-01-01

## 2023-01-01 RX ORDER — DIVALPROEX SODIUM 250 MG/1
500 TABLET, DELAYED RELEASE ORAL AT BEDTIME
Status: DISCONTINUED | OUTPATIENT
Start: 2023-01-01 | End: 2023-01-01 | Stop reason: HOSPADM

## 2023-01-01 RX ORDER — FUROSEMIDE 40 MG
40 TABLET ORAL EVERY MORNING
Status: DISCONTINUED | OUTPATIENT
Start: 2023-01-01 | End: 2023-01-01 | Stop reason: HOSPADM

## 2023-01-01 RX ORDER — OLANZAPINE 5 MG/1
5 TABLET ORAL 2 TIMES DAILY
COMMUNITY

## 2023-01-01 RX ORDER — DONEPEZIL HYDROCHLORIDE 10 MG/1
10 TABLET, FILM COATED ORAL AT BEDTIME
COMMUNITY

## 2023-01-01 RX ORDER — DIVALPROEX SODIUM 125 MG/1
125 CAPSULE, COATED PELLETS ORAL 2 TIMES DAILY
Status: CANCELLED | OUTPATIENT
Start: 2023-01-01

## 2023-01-01 RX ORDER — PROCHLORPERAZINE MALEATE 5 MG
5 TABLET ORAL EVERY 6 HOURS PRN
Status: DISCONTINUED | OUTPATIENT
Start: 2023-01-01 | End: 2023-01-01 | Stop reason: HOSPADM

## 2023-01-01 RX ORDER — OLANZAPINE 5 MG/1
5 TABLET ORAL 2 TIMES DAILY
Status: DISCONTINUED | OUTPATIENT
Start: 2023-01-01 | End: 2023-01-01 | Stop reason: HOSPADM

## 2023-01-01 RX ORDER — CLONIDINE HYDROCHLORIDE 0.1 MG/1
0.1 TABLET ORAL 2 TIMES DAILY
COMMUNITY

## 2023-01-01 RX ORDER — HALOPERIDOL 2 MG/1
2 TABLET ORAL EVERY 6 HOURS PRN
Qty: 14 TABLET | Refills: 0 | Status: SHIPPED | OUTPATIENT
Start: 2023-01-01

## 2023-01-01 RX ORDER — ESCITALOPRAM OXALATE 10 MG/1
10 TABLET ORAL EVERY MORNING
COMMUNITY

## 2023-01-01 RX ORDER — HALOPERIDOL 5 MG/ML
2 INJECTION INTRAMUSCULAR EVERY 6 HOURS PRN
Status: DISCONTINUED | OUTPATIENT
Start: 2023-01-01 | End: 2023-01-01 | Stop reason: HOSPADM

## 2023-01-01 RX ORDER — DIVALPROEX SODIUM 125 MG/1
500 TABLET, DELAYED RELEASE ORAL AT BEDTIME
COMMUNITY

## 2023-01-01 RX ORDER — SODIUM CHLORIDE 9 MG/ML
INJECTION, SOLUTION INTRAVENOUS ONCE
Status: DISCONTINUED | OUTPATIENT
Start: 2023-01-01 | End: 2023-01-01

## 2023-01-01 RX ORDER — MEMANTINE HYDROCHLORIDE 5 MG/1
5 TABLET ORAL 2 TIMES DAILY
COMMUNITY

## 2023-01-01 RX ORDER — GABAPENTIN 100 MG/1
100 CAPSULE ORAL 3 TIMES DAILY
Status: DISCONTINUED | OUTPATIENT
Start: 2023-01-01 | End: 2023-01-01 | Stop reason: HOSPADM

## 2023-01-01 RX ADMIN — DIVALPROEX SODIUM 250 MG: 250 TABLET, DELAYED RELEASE ORAL at 06:27

## 2023-01-01 RX ADMIN — OLANZAPINE 5 MG: 5 TABLET, FILM COATED ORAL at 17:06

## 2023-01-01 RX ADMIN — BUSPIRONE HYDROCHLORIDE 10 MG: 10 TABLET ORAL at 21:03

## 2023-01-01 RX ADMIN — ACETAMINOPHEN 650 MG: 325 TABLET ORAL at 10:38

## 2023-01-01 RX ADMIN — MEMANTINE HYDROCHLORIDE 5 MG: 5 TABLET, FILM COATED ORAL at 08:45

## 2023-01-01 RX ADMIN — DEXTROSE MONOHYDRATE: 50 INJECTION, SOLUTION INTRAVENOUS at 01:17

## 2023-01-01 RX ADMIN — DIVALPROEX SODIUM 250 MG: 250 TABLET, DELAYED RELEASE ORAL at 08:05

## 2023-01-01 RX ADMIN — DIVALPROEX SODIUM 500 MG: 250 TABLET, DELAYED RELEASE ORAL at 21:02

## 2023-01-01 RX ADMIN — BUSPIRONE HYDROCHLORIDE 10 MG: 10 TABLET ORAL at 08:00

## 2023-01-01 RX ADMIN — OLANZAPINE 2.5 MG: 2.5 TABLET, FILM COATED ORAL at 10:38

## 2023-01-01 RX ADMIN — DONEPEZIL HYDROCHLORIDE 10 MG: 10 TABLET, FILM COATED ORAL at 21:27

## 2023-01-01 RX ADMIN — BUSPIRONE HYDROCHLORIDE 10 MG: 10 TABLET ORAL at 21:24

## 2023-01-01 RX ADMIN — TRAZODONE HYDROCHLORIDE 25 MG: 50 TABLET ORAL at 22:14

## 2023-01-01 RX ADMIN — MEMANTINE HYDROCHLORIDE 5 MG: 5 TABLET, FILM COATED ORAL at 08:55

## 2023-01-01 RX ADMIN — OLANZAPINE 7.5 MG: 5 TABLET, FILM COATED ORAL at 21:04

## 2023-01-01 RX ADMIN — MEMANTINE HYDROCHLORIDE 5 MG: 5 TABLET, FILM COATED ORAL at 08:01

## 2023-01-01 RX ADMIN — DIVALPROEX SODIUM 500 MG: 250 TABLET, DELAYED RELEASE ORAL at 21:23

## 2023-01-01 RX ADMIN — ESCITALOPRAM OXALATE 10 MG: 10 TABLET ORAL at 07:02

## 2023-01-01 RX ADMIN — ESCITALOPRAM OXALATE 10 MG: 10 TABLET ORAL at 06:26

## 2023-01-01 RX ADMIN — TRAZODONE HYDROCHLORIDE 50 MG: 50 TABLET ORAL at 21:05

## 2023-01-01 RX ADMIN — MEMANTINE HYDROCHLORIDE 5 MG: 5 TABLET, FILM COATED ORAL at 21:04

## 2023-01-01 RX ADMIN — TRAZODONE HYDROCHLORIDE 25 MG: 50 TABLET ORAL at 21:08

## 2023-01-01 RX ADMIN — GABAPENTIN 100 MG: 100 CAPSULE ORAL at 08:55

## 2023-01-01 RX ADMIN — FUROSEMIDE 40 MG: 40 TABLET ORAL at 08:56

## 2023-01-01 RX ADMIN — OLANZAPINE 2.5 MG: 2.5 TABLET, FILM COATED ORAL at 17:36

## 2023-01-01 RX ADMIN — GABAPENTIN 100 MG: 100 CAPSULE ORAL at 13:03

## 2023-01-01 RX ADMIN — OLANZAPINE 2.5 MG: 2.5 TABLET, FILM COATED ORAL at 09:03

## 2023-01-01 RX ADMIN — DIVALPROEX SODIUM 250 MG: 250 TABLET, DELAYED RELEASE ORAL at 07:02

## 2023-01-01 RX ADMIN — TRAZODONE HYDROCHLORIDE 25 MG: 50 TABLET ORAL at 21:05

## 2023-01-01 RX ADMIN — MEMANTINE HYDROCHLORIDE 5 MG: 5 TABLET, FILM COATED ORAL at 20:55

## 2023-01-01 RX ADMIN — LISINOPRIL 40 MG: 40 TABLET ORAL at 13:22

## 2023-01-01 RX ADMIN — MEMANTINE HYDROCHLORIDE 5 MG: 5 TABLET, FILM COATED ORAL at 08:00

## 2023-01-01 RX ADMIN — GABAPENTIN 100 MG: 100 CAPSULE ORAL at 13:27

## 2023-01-01 RX ADMIN — HALOPERIDOL LACTATE 2 MG: 5 INJECTION, SOLUTION INTRAMUSCULAR at 01:40

## 2023-01-01 RX ADMIN — OLANZAPINE 5 MG: 5 TABLET, FILM COATED ORAL at 08:56

## 2023-01-01 RX ADMIN — DIVALPROEX SODIUM 500 MG: 250 TABLET, DELAYED RELEASE ORAL at 20:53

## 2023-01-01 RX ADMIN — OLANZAPINE 7.5 MG: 5 TABLET, FILM COATED ORAL at 21:24

## 2023-01-01 RX ADMIN — DONEPEZIL HYDROCHLORIDE 10 MG: 10 TABLET, FILM COATED ORAL at 22:14

## 2023-01-01 RX ADMIN — DIVALPROEX SODIUM 500 MG: 250 TABLET, DELAYED RELEASE ORAL at 22:13

## 2023-01-01 RX ADMIN — GABAPENTIN 100 MG: 100 CAPSULE ORAL at 22:14

## 2023-01-01 RX ADMIN — ESCITALOPRAM OXALATE 10 MG: 10 TABLET ORAL at 08:56

## 2023-01-01 RX ADMIN — BUSPIRONE HYDROCHLORIDE 10 MG: 10 TABLET ORAL at 08:55

## 2023-01-01 RX ADMIN — GABAPENTIN 100 MG: 100 CAPSULE ORAL at 13:22

## 2023-01-01 RX ADMIN — BUSPIRONE HYDROCHLORIDE 10 MG: 10 TABLET ORAL at 22:14

## 2023-01-01 RX ADMIN — BUSPIRONE HYDROCHLORIDE 10 MG: 10 TABLET ORAL at 13:50

## 2023-01-01 RX ADMIN — GABAPENTIN 100 MG: 100 CAPSULE ORAL at 20:55

## 2023-01-01 RX ADMIN — ESCITALOPRAM OXALATE 10 MG: 10 TABLET ORAL at 08:04

## 2023-01-01 RX ADMIN — OLANZAPINE 5 MG: 5 TABLET, FILM COATED ORAL at 17:47

## 2023-01-01 RX ADMIN — DONEPEZIL HYDROCHLORIDE 10 MG: 10 TABLET, FILM COATED ORAL at 21:08

## 2023-01-01 RX ADMIN — MEMANTINE HYDROCHLORIDE 5 MG: 5 TABLET, FILM COATED ORAL at 22:14

## 2023-01-01 RX ADMIN — OLANZAPINE 2.5 MG: 2.5 TABLET, FILM COATED ORAL at 11:48

## 2023-01-01 RX ADMIN — ESCITALOPRAM OXALATE 10 MG: 10 TABLET ORAL at 06:36

## 2023-01-01 RX ADMIN — TRAZODONE HYDROCHLORIDE 25 MG: 50 TABLET ORAL at 21:24

## 2023-01-01 RX ADMIN — GABAPENTIN 100 MG: 100 CAPSULE ORAL at 21:05

## 2023-01-01 RX ADMIN — OLANZAPINE 5 MG: 5 TABLET, FILM COATED ORAL at 08:45

## 2023-01-01 RX ADMIN — GABAPENTIN 100 MG: 100 CAPSULE ORAL at 08:56

## 2023-01-01 RX ADMIN — BUSPIRONE HYDROCHLORIDE 10 MG: 10 TABLET ORAL at 08:56

## 2023-01-01 RX ADMIN — ACETAMINOPHEN 650 MG: 325 TABLET ORAL at 17:13

## 2023-01-01 RX ADMIN — MEMANTINE HYDROCHLORIDE 5 MG: 5 TABLET, FILM COATED ORAL at 21:08

## 2023-01-01 RX ADMIN — GABAPENTIN 100 MG: 100 CAPSULE ORAL at 21:08

## 2023-01-01 RX ADMIN — MEMANTINE HYDROCHLORIDE 5 MG: 5 TABLET, FILM COATED ORAL at 21:24

## 2023-01-01 RX ADMIN — TRAZODONE HYDROCHLORIDE 25 MG: 50 TABLET ORAL at 21:00

## 2023-01-01 RX ADMIN — OLANZAPINE 2.5 MG: 2.5 TABLET, FILM COATED ORAL at 13:03

## 2023-01-01 RX ADMIN — LISINOPRIL 40 MG: 40 TABLET ORAL at 08:00

## 2023-01-01 RX ADMIN — GABAPENTIN 100 MG: 100 CAPSULE ORAL at 21:04

## 2023-01-01 RX ADMIN — OLANZAPINE 5 MG: 5 TABLET, FILM COATED ORAL at 21:08

## 2023-01-01 RX ADMIN — OLANZAPINE 5 MG: 5 TABLET, FILM COATED ORAL at 16:19

## 2023-01-01 RX ADMIN — GABAPENTIN 100 MG: 100 CAPSULE ORAL at 07:55

## 2023-01-01 RX ADMIN — MEMANTINE HYDROCHLORIDE 5 MG: 5 TABLET, FILM COATED ORAL at 08:56

## 2023-01-01 RX ADMIN — OLANZAPINE 7.5 MG: 5 TABLET, FILM COATED ORAL at 21:00

## 2023-01-01 RX ADMIN — GABAPENTIN 100 MG: 100 CAPSULE ORAL at 08:45

## 2023-01-01 RX ADMIN — BUSPIRONE HYDROCHLORIDE 10 MG: 10 TABLET ORAL at 16:53

## 2023-01-01 RX ADMIN — BUSPIRONE HYDROCHLORIDE 10 MG: 10 TABLET ORAL at 13:22

## 2023-01-01 RX ADMIN — GABAPENTIN 100 MG: 100 CAPSULE ORAL at 21:24

## 2023-01-01 RX ADMIN — LISINOPRIL 40 MG: 40 TABLET ORAL at 08:01

## 2023-01-01 RX ADMIN — GABAPENTIN 100 MG: 100 CAPSULE ORAL at 13:50

## 2023-01-01 RX ADMIN — OLANZAPINE 5 MG: 5 TABLET, FILM COATED ORAL at 08:55

## 2023-01-01 RX ADMIN — LISINOPRIL 40 MG: 40 TABLET ORAL at 08:45

## 2023-01-01 RX ADMIN — DONEPEZIL HYDROCHLORIDE 10 MG: 10 TABLET, FILM COATED ORAL at 20:54

## 2023-01-01 RX ADMIN — ACETAMINOPHEN 650 MG: 325 TABLET ORAL at 05:44

## 2023-01-01 RX ADMIN — DOCUSATE SODIUM 100 MG: 100 CAPSULE, LIQUID FILLED ORAL at 08:58

## 2023-01-01 RX ADMIN — OLANZAPINE 5 MG: 5 TABLET, FILM COATED ORAL at 17:27

## 2023-01-01 RX ADMIN — OLANZAPINE 7.5 MG: 5 TABLET, FILM COATED ORAL at 22:14

## 2023-01-01 RX ADMIN — BUSPIRONE HYDROCHLORIDE 10 MG: 10 TABLET ORAL at 08:45

## 2023-01-01 RX ADMIN — OLANZAPINE 5 MG: 5 TABLET, FILM COATED ORAL at 08:00

## 2023-01-01 RX ADMIN — OLANZAPINE 2.5 MG: 2.5 TABLET, FILM COATED ORAL at 01:35

## 2023-01-01 RX ADMIN — GABAPENTIN 100 MG: 100 CAPSULE ORAL at 08:00

## 2023-01-01 RX ADMIN — OLANZAPINE 7.5 MG: 5 TABLET, FILM COATED ORAL at 21:05

## 2023-01-01 RX ADMIN — DONEPEZIL HYDROCHLORIDE 10 MG: 10 TABLET, FILM COATED ORAL at 21:03

## 2023-01-01 RX ADMIN — BUSPIRONE HYDROCHLORIDE 10 MG: 10 TABLET ORAL at 13:27

## 2023-01-01 RX ADMIN — DIVALPROEX SODIUM 250 MG: 250 TABLET, DELAYED RELEASE ORAL at 08:56

## 2023-01-01 RX ADMIN — BUSPIRONE HYDROCHLORIDE 10 MG: 10 TABLET ORAL at 21:08

## 2023-01-01 RX ADMIN — OLANZAPINE 5 MG: 5 TABLET, FILM COATED ORAL at 07:56

## 2023-01-01 RX ADMIN — OLANZAPINE 2.5 MG: 2.5 TABLET, FILM COATED ORAL at 06:33

## 2023-01-01 RX ADMIN — OLANZAPINE 2.5 MG: 2.5 TABLET, FILM COATED ORAL at 22:13

## 2023-01-01 RX ADMIN — DEXTROSE MONOHYDRATE: 50 INJECTION, SOLUTION INTRAVENOUS at 11:41

## 2023-01-01 RX ADMIN — OLANZAPINE 2.5 MG: 2.5 TABLET, FILM COATED ORAL at 21:31

## 2023-01-01 RX ADMIN — DEXTROSE MONOHYDRATE: 50 INJECTION, SOLUTION INTRAVENOUS at 22:39

## 2023-01-01 RX ADMIN — LISINOPRIL 40 MG: 40 TABLET ORAL at 08:56

## 2023-01-01 RX ADMIN — HALOPERIDOL LACTATE 2 MG: 5 INJECTION, SOLUTION INTRAMUSCULAR at 14:36

## 2023-01-01 RX ADMIN — GABAPENTIN 100 MG: 100 CAPSULE ORAL at 14:19

## 2023-01-01 RX ADMIN — BUSPIRONE HYDROCHLORIDE 10 MG: 10 TABLET ORAL at 20:54

## 2023-01-01 RX ADMIN — HALOPERIDOL LACTATE 2 MG: 5 INJECTION, SOLUTION INTRAMUSCULAR at 18:00

## 2023-01-01 ASSESSMENT — ACTIVITIES OF DAILY LIVING (ADL)
ADLS_ACUITY_SCORE: 53
ADLS_ACUITY_SCORE: 48
TOILETING: 1-->ASSISTANCE (EQUIPMENT/PERSON) NEEDED
ADLS_ACUITY_SCORE: 53
ADLS_ACUITY_SCORE: 35
ADLS_ACUITY_SCORE: 47
ADLS_ACUITY_SCORE: 41
ADLS_ACUITY_SCORE: 53
ADLS_ACUITY_SCORE: 53
ADLS_ACUITY_SCORE: 47
ADLS_ACUITY_SCORE: 53
TOILETING_ISSUES: YES
ADLS_ACUITY_SCORE: 47
ADLS_ACUITY_SCORE: 53
ADLS_ACUITY_SCORE: 47
ADLS_ACUITY_SCORE: 41
ADLS_ACUITY_SCORE: 49
ADLS_ACUITY_SCORE: 35
ADLS_ACUITY_SCORE: 53
ADLS_ACUITY_SCORE: 53
ADLS_ACUITY_SCORE: 47
TOILETING: 1-->ASSISTANCE (EQUIPMENT/PERSON) NEEDED (NOT DEVELOPMENTALLY APPROPRIATE)
ADLS_ACUITY_SCORE: 45
ADLS_ACUITY_SCORE: 41
TOILETING_ASSISTANCE: TOILETING DIFFICULTY, ASSISTANCE 1 PERSON
ADLS_ACUITY_SCORE: 47
ADLS_ACUITY_SCORE: 53
ADLS_ACUITY_SCORE: 41
ADLS_ACUITY_SCORE: 53
ADLS_ACUITY_SCORE: 48
ADLS_ACUITY_SCORE: 49
WALKING_OR_CLIMBING_STAIRS_DIFFICULTY: YES
ADLS_ACUITY_SCORE: 53
ADLS_ACUITY_SCORE: 49
DRESS: 1-->ASSISTANCE (EQUIPMENT/PERSON) NEEDED (NOT DEVELOPMENTALLY APPROPRIATE)
ADLS_ACUITY_SCORE: 53
ADLS_ACUITY_SCORE: 48
ADLS_ACUITY_SCORE: 53
ADLS_ACUITY_SCORE: 35
ADLS_ACUITY_SCORE: 54
TRANSFERRING: 1-->ASSISTANCE (EQUIPMENT/PERSON) NEEDED
DEPENDENT_IADLS:: CLEANING;COOKING;LAUNDRY;SHOPPING;MEAL PREPARATION;MEDICATION MANAGEMENT;MONEY MANAGEMENT;TRANSPORTATION
ADLS_ACUITY_SCORE: 47
ADLS_ACUITY_SCORE: 53
ADLS_ACUITY_SCORE: 47
ADLS_ACUITY_SCORE: 45
ADLS_ACUITY_SCORE: 53
ADLS_ACUITY_SCORE: 45
DRESSING/BATHING_DIFFICULTY: YES
DOING_ERRANDS_INDEPENDENTLY_DIFFICULTY: YES
WALKING_OR_CLIMBING_STAIRS: AMBULATION DIFFICULTY, ASSISTANCE 1 PERSON
ADLS_ACUITY_SCORE: 48
ADLS_ACUITY_SCORE: 48
ADLS_ACUITY_SCORE: 47
ADLS_ACUITY_SCORE: 41
ADLS_ACUITY_SCORE: 53
ADLS_ACUITY_SCORE: 53
TRANSFERRING: 0-->ASSISTANCE NEEDED (DEVELOPMENTALLY APPROPRIATE)
ADLS_ACUITY_SCORE: 53
ADLS_ACUITY_SCORE: 47
ADLS_ACUITY_SCORE: 41
ADLS_ACUITY_SCORE: 51
FALL_HISTORY_WITHIN_LAST_SIX_MONTHS: YES
ADLS_ACUITY_SCORE: 48
ADLS_ACUITY_SCORE: 53
ADLS_ACUITY_SCORE: 41
ADLS_ACUITY_SCORE: 53
ADLS_ACUITY_SCORE: 53
ADLS_ACUITY_SCORE: 54
DRESSING/BATHING: BATHING DIFFICULTY, ASSISTANCE 1 PERSON
DIFFICULTY_EATING/SWALLOWING: NO
ADLS_ACUITY_SCORE: 48
ADLS_ACUITY_SCORE: 47
ADLS_ACUITY_SCORE: 49
ADLS_ACUITY_SCORE: 53
ADLS_ACUITY_SCORE: 53
ADLS_ACUITY_SCORE: 44
CONCENTRATING,_REMEMBERING_OR_MAKING_DECISIONS_DIFFICULTY: YES
CHANGE_IN_FUNCTIONAL_STATUS_SINCE_ONSET_OF_CURRENT_ILLNESS/INJURY: NO
DRESS: 1-->ASSISTANCE (EQUIPMENT/PERSON) NEEDED
ADLS_ACUITY_SCORE: 51
ADLS_ACUITY_SCORE: 49
ADLS_ACUITY_SCORE: 47
ADLS_ACUITY_SCORE: 48
ADLS_ACUITY_SCORE: 49
ADLS_ACUITY_SCORE: 41
ADLS_ACUITY_SCORE: 53
ADLS_ACUITY_SCORE: 54
ADLS_ACUITY_SCORE: 53
ADLS_ACUITY_SCORE: 53
BATHING: 1-->ASSISTANCE NEEDED
ADLS_ACUITY_SCORE: 44
ADLS_ACUITY_SCORE: 47
ADLS_ACUITY_SCORE: 47
WEAR_GLASSES_OR_BLIND: NO

## 2023-04-13 PROBLEM — E87.0 HYPERNATREMIA: Status: ACTIVE | Noted: 2023-01-01
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
## 2023-04-13 PROBLEM — S02.2XXA CLOSED FRACTURE OF NASAL BONE, INITIAL ENCOUNTER: Status: ACTIVE | Noted: 2023-01-01

## 2023-04-13 PROBLEM — F02.C11: Status: ACTIVE | Noted: 2023-01-01

## 2023-04-13 PROBLEM — G30.8: Status: ACTIVE | Noted: 2023-01-01
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
## 2023-04-13 NOTE — PLAN OF CARE
Goal Outcome Evaluation:      Problem: Plan of Care - These are the overarching goals to be used throughout the patient stay.    Goal: Plan of Care Review  Description: The Plan of Care Review/Shift note should be completed every shift.  The Outcome Evaluation is a brief statement about your assessment that the patient is improving, declining, or no change.  This information will be displayed automatically on your shift note.  Outcome: Progressing     Patient has no complaints of pain. Vitals stable.  Pt appears to be sleeping comfortably between cares.   Agitated earlier in shift, prior to writer assuming care of pt and received Haldol prn.   Effective in decreasing/resolving agitation.   Patient continues to have elevated Sodium, 153 this am.

## 2023-04-13 NOTE — PROGRESS NOTES
Pt lethargic but somewhat arousalble. Mumbles and grimaces then falls back a sleep. Not alert enough for PO intake. Small incontience episodes. Straight cathed for 870 ml. UA sent. IVF infusing. 1:1 at bedside for safety.     Report given to PATRIZIA Tucker

## 2023-04-13 NOTE — PROGRESS NOTES
Confused. VSS. 1:1 attendant in room. Refusing pills, calm. Resting.patient transferred to room 226 at 1715 on a cart with two nursing assistants transporting. Report given to Katelin ACHARYA

## 2023-04-13 NOTE — CONSULTS
"Care Management Initial Consult    General Information  Assessment completed with: Children, daughter (states she is POA and HCD) Merary via phone  Type of CM/SW Visit: Initial Assessment    Primary Care Provider verified and updated as needed: Yes (CM updated in Epic)   Readmission within the last 30 days: unable to assess (Was at [facility] in [place] 3/7-3/31 and D/C'd to new [facility] ([facility]))         Advance Care Planning: Advance Care Planning Reviewed:  (daughter states has HCD and that she is HCA- states CM can ask [facility] if able to fax copy to CM)        Communication Assessment  Patient's communication style: spoken language (English or Bilingual)        Cognitive  Cognitive/Neuro/Behavioral: not assessed by CM as patient is currently sleeping, calm with 1:1 at bedside for safety    Living Environment:   People in home: facility resident     Current living Arrangements: assisted living  Name of Facility: [facility] in [place]   Able to return to prior arrangements: yes     Family/Social Support:  Care provided by: self, child(marcial) ([facility] staff)  Provides care for: no one  Marital Status:   Children, Significant Other (daughter \"Merary\", son \"Alberto\", S/O \"Chad\". [facility] staff. Friend Kera)       S/O Chad (been together 20 years and lived together until her recent move into [facility])  Description of Support System: Supportive, Involved         Current Resources:   Patient receiving home care services: No  Community Resources:  ([facility]ek[facility] Provider with [facility])  Equipment/supplies currently used at home: Incontinence Supplies (Rx glasses)    Employment/Financial:  Employment Status: retired     Employment/ Comments: no  connection  Financial Concerns: No concerns identified   Referral to Financial Worker: No     Lifestyle & Psychosocial Needs:  Social Determinants of Health     Tobacco Use: Not on file   Alcohol Use: Not on file   Financial Resource Strain: Not on file " "  Food Insecurity: Not on file   Transportation Needs: Not on file   Physical Activity: Not on file   Stress: Not on file   Social Connections: Not on file   Intimate Partner Violence: Not on file   Depression: Not on file   Housing Stability: Not on file     Functional Status:  Prior to admission patient needed assistance:   Dependent ADLs:: Bathing, Dressing, Incontinence, Toileting  Dependent IADLs:: Cleaning, Cooking, Laundry, Shopping, Meal Preparation, Medication Management, Money Management, Transportation ([facility] assists with ADL's as needed. Daughter handles finances. Daughter, son or S/O help with transportation.)  Assesssment of Functional Status: At functional baseline    Mental Health Status:  Mental Health Status:  (hx of delusional disorder and dementia since 2020. Had 3 week geripsych admission in February 2020. Has new [facility]iatrist that started in past two weeks.)       Chemical Dependency Status:  Chemical Dependency Status: No Current Concerns           Values/Beliefs:  Spiritual, Cultural Beliefs, Anabaptist Practices, Values that affect care: no             Additional Information:  Chart reviewed.     CM spoke to sky Reagan via phone; assessment completed.     Just moved into [facility] at discharge from [facility] on 3/31/2023.    03-3/31 2023  [facility] admission: per daughter this was secondary to previous [facility] unwilling to accept patient back (due to behaviors/agitation) and new [facility] needing to be found.     February 2023  Per daughter, patient had a 3 week IP geriatric psych admission at [facility] due to behaviors and agitation and discharged to new [facility]    Had been living with her S/O Chad prior to going into [facility].     Daughter Merary requesting psychiatric stabilization prior to patient returning to [facility] (\"she has had very odd behaviors, she is a danger to herself and others right now, she " "needs medication changes or something to calm her down before going back\"). CM sent text page to MD alerting of daughters request.     Daughter will transport back to [facility].     [facility] in [place] ([facility])  Phone: [phone]  Fax: [phone]  Sonia, Nurse/Director of Health and Wellness [phone]  [facility] uses [facility] in [place] for any new Rx at hospital discharge.   CM spoke to Sonia, who confirms patient resides in [facility]. Indicate patient will need psychiatric stabilization prior to return to facility. Patient gets hourly safety checks, toilet and incontinence every two hours, reminders for activities, independent with ambulation, assist of 1 for bathing (can be resistant at times), behavior management, reminders for dental care, assist of 1 for dressing (can be resistant at times), assist of 1 for grooming (can be resistant at times), escorts to dining and cut up foods to finger size foods. They do laundry and handle medication administration.   [facility] Nurse states patient \"has barely slept since getting here two weeks ago, also up and moving, none of the medications seem to be working\". Report [facility] recently started seeing patient to assist in psychiatric medication management.     [facility] Nurse faxed CM copy of HCD; CM scan/email copy to [employer] requesting ACP Review to validate and updated in Epic and CM also placed paper copy in paper chart.   7:33 PM  Zi Kumar validated HCD and updated in Epic.     Reema Alvarez RN        "

## 2023-04-13 NOTE — PHARMACY-ADMISSION MEDICATION HISTORY
Pharmacy Intern Admission Medication History    Admission medication history is complete. The information provided in this note is only as accurate as the sources available at the time of the update.    Medication reconciliation/reorder completed by provider prior to medication history? Yes    Information Source(s): Facility ([facility]/) medication list/MAR via N/A      Allergies reviewed with patient and updates made in EHR: yes    Medication History Completed By: Jonathon Sesay 4/13/2023 9:45 AM    PTA Med List   Medication Sig Last Dose     busPIRone (BUSPAR) 10 MG tablet Take 10 mg by mouth 3 times daily 4/12/2023 at pm     cloNIDine (CATAPRES) 0.1 MG tablet Take 0.1 mg by mouth 2 times daily 4/12/2023 at pm     divalproex sodium delayed-release (DEPAKOTE) 125 MG DR tablet Take 250 mg by mouth every morning 4/12/2023 at am     divalproex sodium delayed-release (DEPAKOTE) 125 MG DR tablet Take 500 mg by mouth At Bedtime 4/12/2023 at pm     donepezil (ARICEPT) 10 MG tablet Take 10 mg by mouth At Bedtime 4/12/2023 at pm     escitalopram (LEXAPRO) 10 MG tablet Take 10 mg by mouth every morning 4/12/2023 at am     furosemide (LASIX) 40 MG tablet Take 40 mg by mouth every morning 4/12/2023 at am     gabapentin (NEURONTIN) 100 MG capsule Take 100 mg by mouth 3 times daily 4/12/2023 at pm     lisinopril (ZESTRIL) 40 MG tablet Take 40 mg by mouth daily 4/12/2023 at am     melatonin 5 MG tablet Take 5 mg by mouth At Bedtime 4/12/2023 at pm     memantine (NAMENDA) 5 MG tablet Take 5 mg by mouth 2 times daily 4/12/2023 at pm     OLANZapine (ZYPREXA) 5 MG tablet Take 5 mg by mouth 2 times daily 4/12/2023 at pm     OLANZapine (ZYPREXA) 5 MG tablet Take 2.5-5 mg by mouth every 8 hours as needed Unknown at prn     OLANZapine (ZYPREXA) 7.5 MG tablet Take 7.5 mg by mouth At Bedtime 4/12/2023 at pm     traZODone (DESYREL) 50 MG tablet Take 50 mg by mouth At Bedtime 4/12/2023 at pm     traZODone (DESYREL) 50 MG tablet Take 50 mg by  mouth 2 times daily as needed for sleep Unknown at prn
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
## 2023-04-13 NOTE — PROGRESS NOTES
Patient seen and examined.  He can barely converse after the medication that she was given for agitation last night.  She is grimaces to pain when I touched the nose bridge that is bruised.  According to the daughter patient was in general psych unit recently and she requested that patient be seen by psychiatry.      At admission sodium was 156 and is trending down.  Now at 148.  She is receiving dextrose 5 at 50 cc/h.    Labs, medications reviewed.  Discontinue some of her antidepressant and antipsychotic medications.  We will continue to monitor sodium level.  We will check urinalysis to make sure that she does not have UTI.  RN may straight cath to obtain urine specimen for urinalysis.  There is an order and has not been collected from yesterday.

## 2023-04-14 NOTE — PLAN OF CARE
Problem: Plan of Care - These are the overarching goals to be used throughout the patient stay.    Goal: Optimal Comfort and Wellbeing  Outcome: Progressing  Problem: Electrolyte Imbalance  Goal: Electrolyte Imbalance: Plan of Care  Outcome: Progressing   Goal Outcome Evaluation:  Pt restless and agitated upon arrival to unit, disoriented x4, uncooperative and unable to re-oriented, attempted to get OOB multiple times, walked in room, 1:1 at bedside for patient safety, prn Zyprexa given with minimal help, IV haldol given, was effective, continue 1:1, D5 infusing continuously, Na+ check q4h, MD notified and aware, daughter Merary updated, continue to monitor.

## 2023-04-14 NOTE — PROGRESS NOTES
"CLINICAL NUTRITION SERVICES - ASSESSMENT NOTE     Nutrition Prescription    RECOMMENDATIONS FOR MDs/PROVIDERS TO ORDER:  None    Malnutrition Status:    Unable to determine due to lack of data    Recommendations already ordered by Registered Dietitian (RD):  Ensure enlive BID    Future/Additional Recommendations:  Monitor oral intake and weight trends     REASON FOR ASSESSMENT  Beth Ng is a/an 73 year old female assessed by the dietitian for Admission Nutrition Risk Screen for unsure of weight loss.     NUTRITION HISTORY  Patient was sleeping when RD tried to visit today.   No nutrition history collected. No weights on file or taken since admit. Unable to determine if patient has lost any weight.   Chart notes that patient took small bites of meals or was unable to eat d/t being asleep. Poor oral intake since admit.     CURRENT NUTRITION ORDERS  Diet: Low Saturated Fat/2400 mg Sodium, no caffeine  Intake/Tolerance: very minimal intake recorded.     LABS  Labs reviewed    MEDICATIONS  Medications reviewed    ANTHROPOMETRICS  Height: 4' 11\" from 1/18/23 office visit  Most Recent Weight: 121lbs/ 55kg - 1/18/23 office visit  IBW: 98 kg  BMI: Normal BMI  Weight History:   Wt Readings from Last 10 Encounters:   No data found for Wt   121lbs/ 55kg - 1/18/23 office visit  Dosing Weight: 55 kg    ASSESSED NUTRITION NEEDS  Estimated Energy Needs: 4008-5094 kcals/day (25 - 30 kcals/kg)  Justification: Maintenance  Estimated Protein Needs: 55-66 grams protein/day (1 - 1.2 grams of pro/kg)  Justification: Maintenance  Estimated Fluid Needs: (1 mL/kcal)   Justification: Maintenance    MALNUTRITION  % Intake: Unable to assess  % Weight Loss: Unable to assess  Subcutaneous Fat Loss: Unable to assess  Muscle Loss: Unable to assess  Fluid Accumulation/Edema: None noted  Malnutrition Diagnosis: Unable to determine due to lack of data    NUTRITION DIAGNOSIS  Inadequate oral intake related to fatigue/ poor appetite as evidenced " by poor oral intake (0% or small bites) reported by RN    INTERVENTIONS  Implementation  Ensure enlive BID    Goals  Patient to consume % of nutritionally adequate meal trays TID, or the equivalent with supplements/snacks.     Monitoring/Evaluation  Monitor oral intake and weight trends  Amy Sigala RDN, LD

## 2023-04-14 NOTE — PROGRESS NOTES
[facility]    Medicine Progress Note - Hospitalist Service    Date of Admission:  4/12/2023    Assessment & Plan     Beth Ng is a 73 year old female who presented with complaints of weakness, fall, facial injury. Medical history is notable for dementia, paranoia, heart failure, hypertension, breast cancer. Initial evaluation revealed unremarkable vital signs, labs notable for sodium 155, creatinine 1.8, hemoglobin 11.6, .  CT facial bones with nasal bone fractures, CT head without contrast negative for bleed.    On hospital day 1 sodium continued to downtrend towards improvement at 150; on 4/14 the sodium is now normalized at 145. Urinalysis checked appears not-infected. Psychiatry has been consulted since admission, pending consult.       Medication reconciliation was completed yesterday, and the trazodone and Zyprexa were continued.  With an imminent weekend and a potential gap prior to an in-person evaluation by psychiatry, will resume her PTA Aricept, Lexapro, Namenda, and Depakote and BuSpar at her PTA doses 4/14/2023 and DECREASE her trazodone by half to 25mg at bedtime given family-daughter reported potential change in behavior after recent trazodone increase at recent hospital stay.     Writer also called and discussed with the daughter who confirmed details including Haldol being efficacious and behavior change possibly after trazodone increased.  Discussed directly with the .     Pt is new to writer 4/14/2023    Assessment and Plan:  Hyponatremia  Initially felt on admission related to cognitive deficits and poor free water intake; has been improving appropriately and now normalized on 4/14  -Can stop the dextrose infusion  -encourage normal po intake  -follow labs      ESTRELLA  Suspect Prerenal, follow creat; admission 1.78; will recheck.  -recheck bmp  -Hold lasix, lisinopril     Alzheimer's dementia  On a regimen of Aricept, Namenda, Lexapro, trazodone,  BuSpar, as well as Zyprexa per med Rec  Chart review:   --1/18/2023 visit with psychiatry: Hydroxyzine was stopped, started on Seroquel, buspirone 50 mg 4 times daily, citalopram 10 mg twice daily, donepezil, and plan was to follow-up  --Last hospitalization with telepsychiatry recommendations:buspirone increased to 10 mg 3 times daily, Seroquel and Risperdal were stopped, trazodone increased to 50 mg at nighttime  Here thus far:   -Zyprexa 5mg po bid and 7.5mg at bedtime and trazodone 50mg qhs resumed on 4/13  -Psychiatry consulted, appreciate input in the near future  -On 4/14, will resume additional other meds to avoid discontinuation syndrome:    -Donepezil 10 mg at nighttime, Lexapro 10 mg in the morning, Depakote 500 mg at nighttime at 250 mg morning, BuSpar 10 mg 3 times daily  -We will decrease trazodone given this was recently increased and reported behavior change thereafter  -REDUCE to 25 mg trazodone at night time  -continue PRN haldol, appears efficacious though monitor for somnolence     Nasal fractures  Tylenol prn, follow up with ENT as outpt     Essential hypertension  Hx of CHF  Prescribed lisinopril, lasix- held 2/2 to ESTRELLA  - may resume this weekend pending BMP       Diet: Combination Diet Low Saturated Fat Na <2400mg Diet, No Caffeine Diet  Snacks/Supplements Adult: Ensure Enlive; With Meals    DVT Prophylaxis: Pneumatic Compression Devices and Ambulate every shift  Pena Catheter: Not present  Lines: None     Cardiac Monitoring: None  Code Status: No CPR- Do NOT Intubate      Clinically Significant Risk Factors          # Hypernatremia: Highest Na = 155 mmol/L in last 2 days, will monitor as appropriate                        Disposition Plan      Expected Discharge Date: 04/15/2023    Discharge Delays: Specialist Consult (enter specialist & decision needed in comments)  Destination: assisted living ([facility] ([facility]            Alexis Ramos MD  Hospitalist Service  [facility]  [facility]  Securely message with Kasia (more info)  Text page via "[employer] Paging/Directory   ______________________________________________________________________    Interval History   - pt was up and about earlier, appeared restless, then fell asleep  - discussed with 1:1 sitter and bedside RN  - pt is very somnolent this AM but arousable  - writer called and discussed with daughter Merary-she confirmed CODE STATUS is DNR/DNI as consistent with advanced directive  -We discussed recent medication changes including increased trazodone, which patient's daughter feels is when the patient started to become potentially more agitated also with some behavior changes and also reported the haldol historically was helpful  -Daughter requested writer discuss with  regarding potential placement recently had an option for geriatric-psychiatry placement at last hospitalization but then patient clinically improved  -Patient daughter is worried about the patient going back and forth between hospitalizations  -Writer called and discussed with  directly    Physical Exam   Vital Signs: Temp: 97.8  F (36.6  C) Temp src: Axillary BP: 124/60 Pulse: 56   Resp: 16 SpO2: 97 % O2 Device: None (Room air)    Weight: 0 lbs 0 oz    General: somnolent, arouses to stimuli  Pulmonary: clear to auscultation bilaterally, normal respiratory effort, on room air, no rales or wheezes or evidence of accessory muscle use  CVS: regular rate and rhythm, no murmurs, rubs, or gallops; no blatant jugular venous distention; no extremity edema and extremities are warm to the touch  GI: soft, nontender, BS+, no rebound or guarding, no conspicuous organomegaly   Neuro: minimally interactive, somnolent but moves extremities of her own volition, sleepy but arousable  Psych: unable to assess     Medical Decision Making       60 MINUTES SPENT BY ME on the date of service doing chart review, history, exam, documentation &  further activities per the note.   Discussed with the bedside nurse, health unit coordinator, and , and at length with the patient's daughter and then again with the      Data     I have personally reviewed the following data over the past 24 hrs:    N/A  \   N/A   / N/A     143 N/A N/A /  N/A   N/A N/A N/A \       Imaging results reviewed over the past 24 hrs:   No results found for this or any previous visit (from the past 24 hour(s)).

## 2023-04-14 NOTE — PROGRESS NOTES
Care Management Follow Up    Length of Stay (days): 1    Expected Discharge Date: 04/15/2023     Concerns to be Addressed:     Discharge planning  Patient plan of care discussed at interdisciplinary rounds: Yes    Anticipated Discharge Disposition: TBD    Additional Information:  10:34 AM  CM called daughter Merary ([phone]) per her request.  Merary's goals for pt would be to stabilize pt and discharge her back to [facility] without need to admit to [facility]/Psych IP.  Merary is agreeable to [facility]/Psych IP if recommended by Psych.      1:46 PM  Akua RNCM ([phone]) from [facility] called, offered assistance as needed.  Update given to Akua; final discharge plans unknown at this time.    CM to update Akua ACHARYACM regarding pt's discharge plans.    Pt continues on 1:1 sitter.  Psych consult is still pending at this time.    CM will continue to follow.    Juan Pablo Suazo RN

## 2023-04-14 NOTE — PLAN OF CARE
Problem: Plan of Care - These are the overarching goals to be used throughout the patient stay.    Goal: Optimal Comfort and Wellbeing  Outcome: Progressing  Intervention: Monitor Pain and Promote Comfort  Recent Flowsheet Documentation  Taken 4/14/2023 0544 by Yolanda Arnold RN  Pain Management Interventions: medication (see MAR)   Goal Outcome Evaluation: Patient D/Ox4. Speaks softly, word salad, some mumbling does not sound slurred. PERRLA. Moving all extremities, but seems to favor R arm/shoulder and saying ow ow ow when the limb is moved. Tylenol given crushed in applesauce, patient took and drank water with no problem. Bruising on R body. Abrasion to face/nose dried and intact. Up with 2 assist, slept well overnight then went to the chair for a bit. D5% for elevated sodium, level 145 this morning from 149 4 hours prior. Straight cath for 600, does not seem to have emptied bladder spontaneously since admission though she did have some incontinence yesterday. Plan pending.

## 2023-04-15 NOTE — PLAN OF CARE
"Goal Outcome Evaluation:         Problem: Plan of Care - These are the overarching goals to be used throughout the patient stay.    Goal: Plan of Care Review  Description: The Plan of Care Review/Shift note should be completed every shift.  The Outcome Evaluation is a brief statement about your assessment that the patient is improving, declining, or no change.  This information will be displayed automatically on your shift note.  Outcome: Progressing     Problem: Plan of Care - These are the overarching goals to be used throughout the patient stay.    Goal: Patient-Specific Goal (Individualized)  Description: You can add care plan individualizations to a care plan. Examples of Individualization might be:  \"Parent requests to be called daily at 9am for status\", \"I have a hard time hearing out of my right ear\", or \"Do not touch me to wake me up as it startles me\".  Outcome: Progressing         Vitals stable. Did not give morning meds this morning due to patient sleeping and already taking meds crushed with applesauce. PRN Zyprexa given x1 with effectiveness. 1:1 sitter maintained. Discharge pending            "

## 2023-04-15 NOTE — PLAN OF CARE
Problem: Plan of Care - These are the overarching goals to be used throughout the patient stay.    Goal: Optimal Comfort and Wellbeing  Outcome: Progressing     Problem: Risk for Delirium  Goal: Improved Behavioral Control  Outcome: Progressing  Intervention: Minimize Safety Risk  Recent Flowsheet Documentation  Taken 4/15/2023 1620 by Keisha Branch RN  Enhanced Safety Measures:  at bedside    Problem: Oral Intake Inadequate  Goal: Improved Oral Intake  Outcome: Progressing      Goal Outcome Evaluation:       Patient is alert , disoriented x 4, less behavioral today, drowsy and sleept most of the shift, awaked for meal and ,meds, no need for PRNs but gave scheduled meds, continues to have a 1:1 sitter, vital signs stable, discharge pending placement.

## 2023-04-15 NOTE — PROGRESS NOTES
[facility]    Medicine Progress Note - Hospitalist Service    Date of Admission:  4/12/2023    Assessment & Plan     Beth Ng is a 73 year old female who presented with complaints of weakness, fall, facial injury. Medical history is notable for dementia, paranoia, heart failure, hypertension, breast cancer. Initial evaluation revealed unremarkable vital signs, labs notable for sodium 155, creatinine 1.8, hemoglobin 11.6, . CT facial bones with nasal bone fractures, CT head without contrast negative for bleed.    On hospital day 1 sodium continued to downtrend towards improvement at 150; on 4/14 the sodium is now normalized at 145. Urinalysis checked appears not-infected. Psychiatry has been consulted since admission, pending consult.       Medication reconciliation initially completed 4/12 with the trazodone and Zyprexa were continued.  With an imminent weekend and a potential gap prior to an in-person evaluation by psychiatry, resumed her PTA Aricept, Lexapro, Namenda, and Depakote and BuSpar at her PTA doses 4/14/2023 and decreased her trazodone by half to 25mg at bedtime given family-daughter reports of potential change in behavior after a recent trazodone increase from prior hospital stay.     Writer also called and discussed with the daughter who confirmed details including Haldol being efficacious and behavior change possibly after trazodone increased.  Discussed directly with the .     Assessment and Plan:    Alzheimer's dementia  On a regimen of Aricept, Namenda, Lexapro, trazodone, BuSpar, as well as Zyprexa per med Rec  Chart review:   --1/18/2023 visit with psychiatry: Hydroxyzine was stopped, started on Seroquel, buspirone 50 mg 4 times daily, citalopram 10 mg twice daily, donepezil, and plan was to follow-up  --Last hospitalization with telepsychiatry recommendations:buspirone increased to 10 mg 3 times daily, Seroquel and Risperdal were stopped,  trazodone increased to 50 mg at nighttime  Here thus far:   -Zyprexa 5mg po bid and 7.5mg at bedtime and trazodone 50mg qhs resumed on 4/13  -Psychiatry consulted, appreciate input in the near future  -On 4/14, resumed additional other meds to avoid discontinuation syndrome:    -Donepezil 10 mg at nighttime, Lexapro 10 mg in the morning, Depakote 500 mg at nighttime at 250 mg morning, BuSpar 10 mg 3 times daily  -We decreased trazodone given this was recently increased and reported behavior change thereafter  -REDUCE to 25 mg trazodone at night time  - all further adjustments defer to psychiatry when able to assess (will Re-order on 4/17 as per protocol)  -continue PRN haldol, appears efficacious though monitor for somnolence  - question if ativan might be helpful given what appears like behaviorally-mediated being withdrawn     Hypernatremia, improved  Initially felt on admission related to cognitive deficits and poor free water intake; has been improving appropriately and now normalized on 4/14  -Can stop the dextrose infusion  -encourage normal po intake  -follow labs      ESTRELLA, improved  Suspect Prerenal, follow creat; admission 1.78; rechecked and normalized 0.93 4/15/2023   -Hold lasix  - can resume lisinopril 4/15/2023       Nasal fractures  Tylenol prn, follow up with ENT as outpt     Essential hypertension  Hx of CHF  Prescribed lisinopril, lasix- held 2/2 to ESTRELLA  - resumed lisinopril 4/15/2023   - hold lasix for now w/ decreased po intake        Diet: Combination Diet Low Saturated Fat Na <2400mg Diet, No Caffeine Diet  Snacks/Supplements Adult: Ensure Enlive; With Meals    DVT Prophylaxis: Pneumatic Compression Devices and Ambulate every shift  Pena Catheter: Not present  Lines: None     Cardiac Monitoring: None  Code Status: No CPR- Do NOT Intubate      Clinically Significant Risk Factors          # Hypernatremia: Highest Na = 150 mmol/L in last 2 days, will monitor as appropriate                     "    Disposition Plan      Expected Discharge Date: 04/18/2023    Discharge Delays: Specialist Consult (enter specialist & decision needed in comments)  Destination: assisted living ([facility] ( [place]))  Discharge Comments: Psych Hong Ramos MD  Hospitalist Service  [facility]  Securely message with [employer] (more info)  Text page via [employer] Paging/Directory   ______________________________________________________________________    Interval History   -No acute interval events  -The patient is resting, received Zyprexa earlier and is more calm  -The patient is more interactive today, behaviorally keeping her eyes closed, she is declining breakfast, but with the nurse prompting and encouragement, she has tolerated meals; she has been calm with the one-to-one sitter  -Current care plans were updated to the patient, who responded with sounds/grunts and did not report any specific concerns or symptoms aside from \"some pain,\" and the bruising on her nose is stable  -Informed her that writer discussed at length with her daughter on the phone   -discussed with  directly and nursing and huc       Physical Exam   Vital Signs: Temp: 97.4  F (36.3  C) Temp src: Oral BP: (!) 140/65 Pulse: 62   Resp: 18 SpO2: 96 % O2 Device: None (Room air)    Weight: 116 lbs 9.97 oz    General: more alert but closing her eyes, minimally interactive  Pulmonary: clear to auscultation bilaterally, normal respiratory effort, on room air, no rales or wheezes or evidence of accessory muscle use  Eyes- shutting her eyes but able to be pried and then moves in her own volitional directions, not following commands appropriately   CVS: regular rate and rhythm, no murmurs, rubs, or gallops; no blatant jugular venous distention; no extremity edema and extremities are warm to the touch  GI: soft, nontender, BS+, no rebound or guarding, no conspicuous organomegaly   Neuro: minimally interactive, " more alert and interactive but appears behaviorally withdrawn and not following directions except occasionally responding to some questions   Psych: withdrawn, flat affect, appears to be behaviorally not appropriately responding- ie spitting eggs out now but earlier was eating with nursing encouragement; answers questions here or there with short responses or grunts but shutting her eyes    Medical Decision Making       55 MINUTES SPENT BY ME on the date of service doing chart review, history, exam, documentation & further activities per the note.   Discussed with the bedside nurse, health unit coordinator, and , and sitter   Data     I have personally reviewed the following data over the past 24 hrs:    N/A  \   N/A   / N/A     142; 142 104 46 (H) /  102   4.4 30 0.98 \       Imaging results reviewed over the past 24 hrs:   No results found for this or any previous visit (from the past 24 hour(s)).

## 2023-04-15 NOTE — PLAN OF CARE
Problem: Plan of Care - These are the overarching goals to be used throughout the patient stay.    Goal: Optimal Comfort and Wellbeing  Outcome: Progressing  Intervention: Monitor Pain and Promote Comfort  Recent Flowsheet Documentation  Taken 4/15/2023 0100 by Trista Weaver RN  Pain Management Interventions: declines   Goal Outcome Evaluation:       Pt. Will rest tonight with minimal rest and sleep interruptions.

## 2023-04-15 NOTE — PLAN OF CARE
Problem: Plan of Care - These are the overarching goals to be used throughout the patient stay.    Goal: Readiness for Transition of Care  Outcome: Progressing     Problem: Risk for Delirium  Goal: Improved Behavioral Control  Outcome: Progressing     VSS. Denies pain. 1:1 sitter maintained. Pt did not void on shift, bladder scanned for 151mL. Awaiting Na recheck for 2200. Bedrest maintained.

## 2023-04-16 NOTE — PROGRESS NOTES
[facility]    Medicine Progress Note - Hospitalist Service    Date of Admission:  4/12/2023    Assessment & Plan     Beth Ng is a 73 year old female who presented with complaints of weakness, fall, facial injury. Medical history is notable for dementia, paranoia, heart failure, hypertension, breast cancer. Initial evaluation revealed unremarkable vital signs, labs notable for sodium 155, creatinine 1.8, hemoglobin 11.6, . CT facial bones with nasal bone fractures, CT head without contrast negative for bleed.    On hospital day 1 sodium continued to downtrend towards improvement at 150; on 4/14 the sodium is now normalized at 145. Urinalysis checked appears not-infected. Psychiatry has been consulted since admission, pending consult.       Medication reconciliation initially completed 4/12 with the trazodone and Zyprexa were continued.  With an imminent weekend and a potential gap prior to an in-person evaluation by psychiatry, resumed her PTA Aricept, Lexapro, Namenda, and Depakote and BuSpar at her PTA doses 4/14/2023 and decreased her trazodone by half to 25mg at bedtime given family-daughter reports of potential change in behavior after a recent trazodone increase from prior hospital stay.     Writer also called and discussed with the daughter who confirmed details including Haldol being efficacious and behavior change possibly after trazodone increased.  Discussed directly with the .     Pending psychiatry input. Clinically responding; more interactive 4/16/2023 and answering questions though not consistently appropriately.     Assessment and Plan:    Alzheimer's dementia  - as above, more interactive noted on 4/16. Psychiatry input pending  On a regimen of Aricept, Namenda, Lexapro, trazodone, BuSpar, as well as Zyprexa per med Rec  Chart review:   --1/18/2023 visit with psychiatry: Hydroxyzine was stopped, started on Seroquel, buspirone 50 mg 4 times daily,  citalopram 10 mg twice daily, donepezil, and plan was to follow-up  --Last hospitalization with telepsychiatry recommendations:buspirone increased to 10 mg 3 times daily, Seroquel and Risperdal were stopped, trazodone increased to 50 mg at nighttime  Here thus far:   -Zyprexa 5mg po bid and 7.5mg at bedtime and trazodone 50mg qhs resumed on 4/13  -Psychiatry consulted, appreciate input in the near future  -On 4/14, resumed additional other meds to avoid discontinuation syndrome:    -Donepezil 10 mg at nighttime, Lexapro 10 mg in the morning, Depakote 500 mg at nighttime at 250 mg morning, BuSpar 10 mg 3 times daily  -We decreased trazodone given this was recently increased and reported behavior change thereafter  -REDUCE to 25 mg trazodone at night time  - all further adjustments defer to psychiatry when able to assess (will Re-order on 4/17 as per protocol)  -continue PRN haldol, appears efficacious though monitor for somnolence  - question if ativan might be helpful given what appears like behaviorally-mediated being withdrawn     Macrocytic anemia  Assessment: On admission noted at 11.6 but the delta 4/16/2023 is significant at 9.2; no signs or symptoms concerning for bleed but question a volume/dilution component and will monitor vs other process. She did have a fall, so may need to scan for a hematoma or fluid collection if further progressing.   Plan:  - follow hgb - recheck in PM and AM  - transfuse if hgb < 7  - consider CT if further dropping     Hypernatremia, improved  Initially felt on admission related to cognitive deficits and poor free water intake; has been improving appropriately and now normalized on 4/14  -Can stop the dextrose infusion  -encourage normal po intake  -follow labs      ESTRELLA, improved  Suspect Prerenal, follow creat; admission 1.78; rechecked and normalized 0.93 4/15/2023   -Hold lasix  - can resume lisinopril 4/15/2023       Nasal fractures  Tylenol prn, follow up with ENT as  outpt     Essential hypertension  Hx of CHF  Prescribed lisinopril, lasix- held 2/2 to ESTRELLA  - resumed lisinopril 4/15/2023   - hold lasix for now w/ decreased po intake    -Likely resume on 4/17       Diet: Combination Diet Low Saturated Fat Na <2400mg Diet, No Caffeine Diet  Snacks/Supplements Adult: Ensure Enlive; With Meals    DVT Prophylaxis: Pneumatic Compression Devices and Ambulate every shift  Pena Catheter: Not present  Lines: None     Cardiac Monitoring: None  Code Status: No CPR- Do NOT Intubate      Clinically Significant Risk Factors                                 Disposition Plan      Expected Discharge Date: 04/18/2023    Discharge Delays: Specialist Consult (enter specialist & decision needed in comments)  Destination: assisted living ([facility] ([facility]  Discharge Comments: Psych Hong Ramos MD  Hospitalist Service  [facility]  Securely message with [employer] (more info)  Text page via [facility] Paging/Directory   ______________________________________________________________________    Interval History   -No acute interval events  - she is noticeably more interactive 4/16/2023 and answering questions though not consistently appropriately.   -The patient is sitting up at edge of bed, tolerating her breakfast with sitter at bedside  -Discussed with bedside nursing, 2 N. MDR rounds in room  - pt remains calm  - care plans expressed to her and updated her that we updated her daughter and pending psychiatry input; pt said okay and wanted to rest after eating breakfast      Physical Exam   Vital Signs: Temp: 97.9  F (36.6  C) Temp src: Axillary BP: 132/64 Pulse: 84   Resp: 18 SpO2: 97 % O2 Device: None (Room air)    Weight: 117 lbs 8.08 oz    General: more alert and sitting at edge of bed, responding to questions though not with consistent responses  Pulmonary: clear to auscultation bilaterally, normal respiratory effort, on room air, no rales or wheezes  or evidence of accessory muscle use  Eyes- shutting her eyes but able to be pried and then moves in her own volitional directions, not following commands appropriately   CVS: regular rate and rhythm, no murmurs, rubs, or gallops; no blatant jugular venous distention; no extremity edema and extremities are warm to the touch  GI: soft, nontender, BS+, no rebound or guarding, no conspicuous organomegaly   Neuro: more interactive (most since admission), moves all extremities of their own volition, no weakness noted and tolerated breakfast, chewing food okay with sitter observing   Psych: flat affect, more responsive, but appears overall still withdrawn though more conversant and cooperative, otherwise difficult to further assess     Medical Decision Making       55 MINUTES SPENT BY ME on the date of service doing chart review, history, exam, documentation & further activities per the note.   Discussed with the bedside nurse, health unit coordinator, and , and sitter   Data     I have personally reviewed the following data over the past 24 hrs:    N/A  \   9.2 (L)   / N/A     N/A N/A N/A /  N/A   N/A N/A N/A \       Imaging results reviewed over the past 24 hrs:   No results found for this or any previous visit (from the past 24 hour(s)).

## 2023-04-16 NOTE — PLAN OF CARE
Problem: Risk for Delirium  Goal: Optimal Coping  Outcome: Progressing     Problem: Risk for Delirium  Goal: Improved Behavioral Control  Outcome: Progressing  Intervention: Minimize Safety Risk  Recent Flowsheet Documentation  Taken 4/16/2023 0030 by Christine Sánchez RN  Enhanced Safety Measures:  at bedside    Disoriented x4. VSS, on RA. Denied any pain. Up A1 to bathroom. PRN Zyprexa given. Patient rested well overnight. Did walk in halls this AM. Discharge pending placement.

## 2023-04-16 NOTE — PLAN OF CARE
Problem: Risk for Delirium  Goal: Improved Behavioral Control  Intervention: Minimize Safety Risk  Recent Flowsheet Documentation  Taken 4/16/2023 0900 by Keisha Branch RN  Enhanced Safety Measures:  at bedside     Problem: Risk for Delirium  Goal: Improved Behavioral Control  Intervention: Minimize Safety Risk  Recent Flowsheet Documentation  Taken 4/16/2023 0900 by Keisha Branch RN  Enhanced Safety Measures:  at bedside     Problem: Plan of Care - These are the overarching goals to be used throughout the patient stay.    Goal: Absence of Hospital-Acquired Illness or Injury  Intervention: Identify and Manage Fall Risk  Recent Flowsheet Documentation  Taken 4/16/2023 0900 by Keisha Branch RN  Safety Promotion/Fall Prevention: activity supervised   Goal Outcome Evaluation:       Patient is alert , disoriented x 4, awake for meals and meds, given PRN Zyprexa and Haldol given x 1 for restless, continues to have a 1:1 sitter at bedside, paced in the hallway and room, vitally stable, good oral intake, voiding spontaneously.

## 2023-04-17 NOTE — PROGRESS NOTES
[facility]    Medicine Progress Note - Hospitalist Service    Date of Admission:  4/12/2023    Assessment & Plan     Beth Ng is a 73 year old female who presented with complaints of weakness, fall, facial injury. Medical history is notable for dementia, paranoia, heart failure, hypertension, breast cancer. Initial evaluation revealed unremarkable vital signs, labs notable for sodium 155, creatinine 1.8, hemoglobin 11.6, . CT facial bones with nasal bone fractures, CT head without contrast negative for bleed.    On hospital day 1 sodium continued to downtrend towards improvement at 150; on 4/14 the sodium is now normalized at 145. Urinalysis checked appears not-infected. Psychiatry has been consulted since admission, pending consult.       Medication reconciliation initially completed 4/12 with the trazodone and Zyprexa were continued.  With an imminent weekend and a potential gap prior to an in-person evaluation by psychiatry, resumed her PTA Aricept, Lexapro, Namenda, and Depakote and BuSpar at her PTA doses 4/14/2023 and decreased her trazodone by half to 25mg at bedtime given family-daughter reports of potential change in behavior after a recent trazodone increase from prior hospital stay.     Writer also called and discussed with the daughter who confirmed details including Haldol being efficacious and behavior change possibly after trazodone increased.  Discussed directly with the .     Pending psychiatry input. Clinically responding; more interactive 4/16/2023 and answering questions though not consistently appropriately. About the same on 4/17/2023, answering some questions correctly.     Assessment and Plan:    Alzheimer's dementia  - as above, more interactive noted on 4/16 and similar on 4/17. Psychiatry input pending  On a regimen of Aricept, Namenda, Lexapro, trazodone, BuSpar, as well as Zyprexa per med Rec  Chart review:   --1/18/2023 visit with  psychiatry: Hydroxyzine was stopped, started on Seroquel, buspirone 50 mg 4 times daily, citalopram 10 mg twice daily, donepezil, and plan was to follow-up  --Last hospitalization with telepsychiatry recommendations:buspirone increased to 10 mg 3 times daily, Seroquel and Risperdal were stopped, trazodone increased to 50 mg at nighttime  Here thus far:   -Zyprexa 5mg po bid and 7.5mg at bedtime and trazodone 50mg qhs resumed on 4/13  -Psychiatry consulted, appreciate input in the near future  -On 4/14, resumed additional other meds to avoid discontinuation syndrome:    -Donepezil 10 mg at nighttime, Lexapro 10 mg in the morning, Depakote 500 mg at nighttime at 250 mg morning, BuSpar 10 mg 3 times daily  -We had decreased trazodone given this was recently increased and reported behavior change thereafter  -REDUCE to 25 mg trazodone at night time  - all further adjustments defer to psychiatry when able to assess (will Re-order on 4/17 as per protocol)  -continue PRN haldol, appears efficacious though monitor for somnolence  - question if ativan might be helpful given what appears like behaviorally-mediated being withdrawn     Macrocytic anemia  Assessment: On admission noted at 11.6 but the delta 4/16/2023 is significant at 9.2; no signs or symptoms concerning for bleed but question a volume/dilution component and will monitor vs other process. She did have a fall, so may need to scan for a hematoma or fluid collection if further progressing.  It is stable at 9.7 on 4/17/2023, possible the first read was not actual baseline or dilution-related.  Plan:  - follow hgb   - transfuse if hgb < 7  - consider CT if further dropping or vitally unstable     Hypernatremia, improved  Initially felt on admission related to cognitive deficits and poor free water intake; has been improving appropriately and now normalized on 4/14  -Can stop the dextrose infusion  -encourage normal po intake  -follow labs      ESTRELLA, improved  Suspect  Prerenal, follow creat; admission 1.78; rechecked and normalized 0.93 4/15/2023   -Hold lasix  -resume lisinopril 4/15/2023       Nasal fractures  Tylenol prn, follow up with ENT as outpt     Essential hypertension  Hx of CHF  Prescribed lisinopril, lasix- held 2/2 to ESTRELLA  - resumed lisinopril 4/15/2023   - hold lasix for now w/ decreased po intake    -Likely resume on 4/17       Diet: Combination Diet Low Saturated Fat Na <2400mg Diet, No Caffeine Diet  Snacks/Supplements Adult: Ensure Enlive; With Meals    DVT Prophylaxis: Pneumatic Compression Devices and Ambulate every shift  Pena Catheter: Not present  Lines: None     Cardiac Monitoring: None  Code Status: No CPR- Do NOT Intubate      Clinically Significant Risk Factors                                 Disposition Plan     Expected Discharge Date: 04/18/2023    Discharge Delays: Specialist Consult (enter specialist & decision needed in comments)  Destination: assisted living ([facility] ([facility]  Discharge Comments: Psych Eval          Alexis Ramos MD  Hospitalist Service  [facility]  Securely message with [employer] (more info)  Text page via [employer] Paging/Directory   ______________________________________________________________________    Interval History   -No acute interval events  - she was noticeably more interactive 4/16/2023 and answering questions though not consistently appropriately-- similar today; answers questions but sometimes no response  - she is calm, and sitter notes she was hyperactive earlier but now resting  - pt denied having new symptoms or pain or breathing issues  - care plans again expressed to her   - discussed w/ charge RN about logistics of psychiatry consult; a triage/liaison seeing/evaluating today      Physical Exam   Vital Signs: Temp: 98  F (36.7  C) Temp src: Axillary BP: (!) 149/68 Pulse: 73   Resp: 20 SpO2: 93 % O2 Device: None (Room air)    Weight: 117 lbs 8.08 oz    General: more alert  and resting in bed; again responding to questions occasionally appropriately   Pulmonary: clear to auscultation bilaterally, normal respiratory effort, on room air, no rales or wheezes or evidence of accessory muscle use  Eyes- shutting her eyes but able to be pried and then moves in her own volitional directions, not following commands appropriately   CVS: regular rate and rhythm, no murmurs, rubs, or gallops; no blatant jugular venous distention; no extremity edema and extremities are warm to the touch  GI: soft, nontender, BS+, no rebound or guarding, no conspicuous organomegaly   Neuro: more interactive starting since 4/16; moves all extremities of their own volition, no weakness noted and tolerated breakfast, chewing food okay with sitter observing   Psych: flat affect, more responsive, but appears overall a bit withdrawn though remains more conversant with prompting and cooperative, otherwise difficult to further assess     Medical Decision Making       52 MINUTES SPENT BY ME on the date of service doing chart review, history, exam, documentation & further activities per the note.   Discussed with the bedside nurse, health unit coordinator, and , and sitter   Data     I have personally reviewed the following data over the past 24 hrs:    8.7  \   9.7 (L)   / 204     N/A N/A N/A /  N/A   N/A N/A N/A \       Imaging results reviewed over the past 24 hrs:   No results found for this or any previous visit (from the past 24 hour(s)).

## 2023-04-17 NOTE — PLAN OF CARE
Problem: Plan of Care - These are the overarching goals to be used throughout the patient stay.    Goal: Absence of Hospital-Acquired Illness or Injury  Intervention: Identify and Manage Fall Risk  Recent Flowsheet Documentation  Taken 4/17/2023 0758 by Juany Mckinley RN  Safety Promotion/Fall Prevention:    supervised activity    safety round/check completed    room organization consistent    room near nurse's station    room door open    nonskid shoes/slippers when out of bed    mobility aid in reach    lighting adjusted    increase visualization of patient    increased rounding and observation    clutter free environment maintained    bedside attendant    assistive device/personal items within reach    activity supervised     Problem: Plan of Care - These are the overarching goals to be used throughout the patient stay.    Goal: Optimal Comfort and Wellbeing  Outcome: Progressing     Problem: Risk for Delirium  Goal: Improved Behavioral Control  Intervention: Prevent and Manage Agitation  Recent Flowsheet Documentation  Taken 4/17/2023 0758 by Juany Mckinley RN  Environment Familiarity/Consistency: (1:1 sitter at bedside) other (see comments)    Goal Outcome Evaluation: Alert with intermittent confusion, compliant, re-orientable, agitation, anxiety, restlessness managed with scheduled and PRN oral Zyprexa, increased activities in the room--ambulation, therapeutic distraction, re-orient to environment, 1:1 sitter. HTN managed with scheduled medication, denied pain, oral intake tolerated well, able to swallow medications crushed and mixed with applesauce. Patient noted sound asleep at this time. Will continue to monitor. -Juany CARLIN RN

## 2023-04-17 NOTE — CONSULTS
"Triage and Transition - Consult and Liaison   [phone]  April 17, 2023      Beth Ng  1950    Plan:     Will ask psychiatry provider to follow up, left message.    Pt with hx of dementia/agitation.    Continue care coordination.    Maintain current transition plan per care team. Next steps include: Psychiatry provider follow-up.        Presenting problem, including what brought patient to hospital: Beth Ng is followed related to \"dementia with sever agitation\"    Reason for consult: Requested by Ramirez Marshall MD .    Reason for inability to complete assessment with patient:  Pt noted to be disoriented x4, with alert to self at times, unable to provide meaningful information at this time.    Historical information:   Per chart review and H&P noting:   Beth Ng is a 73 year old female who presented with complaints of weakness, fall, facial injury. Medical history is notable for dementia, paranoia, heart failure, hypertension, breast cancer.    Pt is noted to be disoriented x4, at times oriented to self only, pleasantly confused, and restless at HS.  Pt with a hx of dementia and a recent fall.  Pt been pacing the hallways, also having good oral intake.      Daughter notes potential change in pt behavior after recent trazodone increase from previous hospital encounter.  Pt with many med adjustments and care team psych med questions, will ask psychiatry provider to follow up with pt, left message, likely 4/18, when she returns.     Medications: Per EHR at time of this note.  Current Facility-Administered Medications   Medication     acetaminophen (TYLENOL) tablet 650 mg    Or     acetaminophen (TYLENOL) Suppository 650 mg     busPIRone (BUSPAR) tablet 10 mg     divalproex sodium delayed-release (DEPAKOTE) DR tablet 250 mg     divalproex sodium delayed-release (DEPAKOTE) DR tablet 500 mg     docusate sodium (COLACE) capsule 100 mg     donepezil (ARICEPT) tablet 10 mg     escitalopram " (LEXAPRO) tablet 10 mg     gabapentin (NEURONTIN) capsule 100 mg     haloperidol lactate (HALDOL) injection 2 mg     lidocaine (LMX4) cream     lidocaine 1 % 0.1-1 mL     lisinopril (ZESTRIL) tablet 40 mg     memantine (NAMENDA) tablet 5 mg     OLANZapine (zyPREXA) tablet 2.5 mg     OLANZapine (zyPREXA) tablet 5 mg     OLANZapine (zyPREXA) tablet 7.5 mg     ondansetron (ZOFRAN ODT) ODT tab 4 mg    Or     ondansetron (ZOFRAN) injection 4 mg     prochlorperazine (COMPAZINE) injection 5 mg    Or     prochlorperazine (COMPAZINE) tablet 5 mg    Or     prochlorperazine (COMPAZINE) suppository 12.5 mg     sodium chloride (PF) 0.9% PF flush 3 mL     sodium chloride (PF) 0.9% PF flush 3 mL     traZODone (DESYREL) half-tab 25 mg     Medications Prior to Admission   Medication Sig Dispense Refill Last Dose     busPIRone (BUSPAR) 10 MG tablet Take 10 mg by mouth 3 times daily   4/12/2023 at pm     cloNIDine (CATAPRES) 0.1 MG tablet Take 0.1 mg by mouth 2 times daily   4/12/2023 at pm     divalproex sodium delayed-release (DEPAKOTE) 125 MG DR tablet Take 250 mg by mouth every morning   4/12/2023 at am     divalproex sodium delayed-release (DEPAKOTE) 125 MG DR tablet Take 500 mg by mouth At Bedtime   4/12/2023 at pm     donepezil (ARICEPT) 10 MG tablet Take 10 mg by mouth At Bedtime   4/12/2023 at pm     escitalopram (LEXAPRO) 10 MG tablet Take 10 mg by mouth every morning   4/12/2023 at am     furosemide (LASIX) 40 MG tablet Take 40 mg by mouth every morning   4/12/2023 at am     gabapentin (NEURONTIN) 100 MG capsule Take 100 mg by mouth 3 times daily   4/12/2023 at pm     lisinopril (ZESTRIL) 40 MG tablet Take 40 mg by mouth daily   4/12/2023 at am     melatonin 5 MG tablet Take 5 mg by mouth At Bedtime   4/12/2023 at pm     memantine (NAMENDA) 5 MG tablet Take 5 mg by mouth 2 times daily   4/12/2023 at pm     OLANZapine (ZYPREXA) 5 MG tablet Take 5 mg by mouth 2 times daily   4/12/2023 at pm     OLANZapine (ZYPREXA) 5 MG  tablet Take 2.5-5 mg by mouth every 8 hours as needed   Unknown at prn     OLANZapine (ZYPREXA) 7.5 MG tablet Take 7.5 mg by mouth At Bedtime   4/12/2023 at pm     traZODone (DESYREL) 50 MG tablet Take 50 mg by mouth At Bedtime   4/12/2023 at pm     traZODone (DESYREL) 50 MG tablet Take 50 mg by mouth 2 times daily as needed for sleep   Unknown at prn       Collateral information:   Reviewed chart and coordinated with psychiatric provider for pt follow up, left message.      SCOT RICHTER Psychotherapist Trainee  Triage and Transition - Consult and Liaison   [phone]

## 2023-04-17 NOTE — PLAN OF CARE
Problem: Risk for Delirium  Goal: Optimal Coping  Outcome: Progressing    A&O to self only, pleasantly confused. Restless before bed. Scheduled meds given; effective. Walked in halls on shift. Denied pain. VSS, on RA. PIV SL. Discharge pending psych eval.

## 2023-04-17 NOTE — PROGRESS NOTES
CLINICAL NUTRITION SERVICES - REASSESSMENT NOTE     Nutrition Prescription    RECOMMENDATIONS FOR MDs/PROVIDERS TO ORDER:  none    Malnutrition Status:    Does not meet criteria     Recommendations already ordered by Registered Dietitian (RD):  discontinue supplements     Future/Additional Recommendations:  Will monitor po      EVALUATION OF THE PROGRESS TOWARD GOALS   Diet: Low Saturated Fat Na <2400mg Diet, No Caffeine Diet; Snacks/Supplements Adult: Ensure Enlive; With Meals, 2 x daily   Intake: 100% of meals   For 4/16: 2200 calories, 119g protein which would be > 100% of estimated needs      NEW FINDINGS   No new findings    ANTHROPOMETRICS  04/16/23 0216 53.3 kg (117 lb 8.1 oz) Bed scale   04/15/23 0500 52.9 kg (116 lb 10 oz)        PHYSICAL FINDINGS  Per flowsheet:  GI-WDL  Skin-no skin breakdown    LABS  Reviewed, BUN-46, Ca-8.2    MEDICATIONS  Reviewed      NUTRITION DIAGNOSIS  Inadequate oral intake related to fatigue/ poor appetite as evidenced by poor oral intake (0% or small bites) reported by RN  -evaluation: met    Goals:  Patient to consume % of nutritionally adequate meal trays TID, or the equivalent with supplements/snacks. -met    INTERVENTIONS  Implementation  Intake appears adequate will discontinue supplements       Monitoring/Evaluation  Progress toward goals will be monitored and evaluated per protocol.

## 2023-04-18 NOTE — PROGRESS NOTES
[facility]    Medicine Progress Note - Hospitalist Service    Date of Admission:  4/12/2023    Assessment & Plan     Beth Ng is a 73 year old female who presented with complaints of weakness, fall, facial injury. Medical history is notable for dementia, paranoia, heart failure, hypertension, breast cancer. Initial evaluation revealed unremarkable vital signs, labs notable for sodium 155, creatinine 1.8, hemoglobin 11.6, . CT facial bones with nasal bone fractures, CT head without contrast negative for bleed.    On hospital day 1 sodium continued to downtrend towards improvement at 150; on 4/14 the sodium is now normalized at 145. Urinalysis checked appears not-infected. Psychiatry has been consulted since admission, pending consult.       Medication reconciliation initially completed 4/12 with the trazodone and Zyprexa were continued.  With an imminent weekend and a potential gap prior to an in-person evaluation by psychiatry, resumed her PTA Aricept, Lexapro, Namenda, and Depakote and BuSpar at her PTA doses 4/14/2023 and decreased her trazodone by half to 25mg at bedtime given family-daughter reports of potential change in behavior after a recent trazodone increase from prior hospital stay.     Writer also called and discussed with the daughter who confirmed details including Haldol being efficacious and behavior change possibly after trazodone increased.  Discussed directly with the .     Pending psychiatry input, seeing 4/18/2023. Clinically responding; more interactive since 4/16/2023 and answering questions though not consistently appropriately. About the same on 4/18/2023, answering some questions correctly.     Assessment and Plan:    Alzheimer's dementia  - as above, more interactive noted on 4/16 and similar on 4/17. Psychiatry input pending  On a regimen of Aricept, Namenda, Lexapro, trazodone, BuSpar, as well as Zyprexa per med Rec  Chart review:    --1/18/2023 visit with psychiatry: Hydroxyzine was stopped, started on Seroquel, buspirone 50 mg 4 times daily, citalopram 10 mg twice daily, donepezil, and plan was to follow-up  --Last hospitalization with telepsychiatry recommendations:buspirone increased to 10 mg 3 times daily, Seroquel and Risperdal were stopped, trazodone increased to 50 mg at nighttime  Here thus far:   -Zyprexa 5mg po bid and 7.5mg at bedtime and trazodone 50mg qhs resumed on 4/13  -Psychiatry consulted, appreciate input in the near future  -On 4/14, resumed additional other meds to avoid discontinuation syndrome:    -Donepezil 10 mg at nighttime, Lexapro 10 mg in the morning, Depakote 500 mg at nighttime at 250 mg morning, BuSpar 10 mg 3 times daily  -We had decreased trazodone given this was recently increased and reported behavior change thereafter  -REDUCE to 25 mg trazodone at night time  - all further adjustments defer to psychiatry when able to assess (will Re-order on 4/17 as per protocol)  -continue PRN haldol, appears efficacious though monitor for somnolence  - question if ativan might be helpful given what appears like behaviorally-mediated being withdrawn   --- psychiatry consulted and seeing 4/18/2023, greatly appreciate    Macrocytic anemia  Assessment: On admission noted at 11.6 but the delta 4/16/2023 is significant at 9.2; no signs or symptoms concerning for bleed but question a volume/dilution component and will monitor vs other process. She did have a fall, so may need to scan for a hematoma or fluid collection if further progressing.  It is stable at 9.7 on 4/17/2023, possible the first read was not actual baseline or dilution-related.  Plan:  - follow hgb   - transfuse if hgb < 7  - consider CT if further dropping or vitally unstable     Hypernatremia, improved  Initially felt on admission related to cognitive deficits and poor free water intake; has been improving appropriately and now normalized on 4/14  -Can stop  the dextrose infusion  -encourage normal po intake  -follow labs      ESTRELLA, improved  Suspect Prerenal, follow creat; admission 1.78; rechecked and normalized 0.93 4/15/2023   -Hold lasix  -resume lisinopril 4/15/2023       Nasal fractures  Tylenol prn, follow up with ENT as outpt     Essential hypertension  Hx of CHF  Prescribed lisinopril, lasix- held 2/2 to ESTRELLA  - resumed lisinopril 4/15/2023   - hold lasix for now w/ decreased po intake    -resume lasix 4/18/2023        Diet: Combination Diet Low Saturated Fat Na <2400mg Diet, No Caffeine Diet    DVT Prophylaxis: Pneumatic Compression Devices and Ambulate every shift  Pena Catheter: Not present  Lines: None     Cardiac Monitoring: None  Code Status: No CPR- Do NOT Intubate      Clinically Significant Risk Factors                                 Disposition Plan      Expected Discharge Date: 04/20/2023    Discharge Delays: Specialist Consult (enter specialist & decision needed in comments)  Destination: assisted living ([facility] ([facility]  Discharge Comments: Psych Eval          Alexis Ramos MD  Hospitalist Service  [facility]  Securely message with [employer] (more info)  Text page via [employer] Paging/Directory   ______________________________________________________________________    Interval History   -No acute interval events  - she was noticeably more interactive 4/16/2023 and answering questions though not consistently appropriately-- similar on 4/17 and today - answers questions but sometimes no response  - she is calm, resting in chair outside room in lobby  - pt denied having new symptoms or pain or breathing issues  - care plans again expressed to her   - discussed w/ RN       Physical Exam   Vital Signs: Temp: (!) 96.4  F (35.8  C) Temp src: Axillary BP: 121/57 Pulse: 82   Resp: 18 SpO2: 98 % O2 Device: None (Room air)    Weight: 120 lbs 12.99 oz    General: more alert and resting in bed; again responding to questions  occasionally appropriately   Pulmonary: clear to auscultation bilaterally, normal respiratory effort, on room air, no rales or wheezes or evidence of accessory muscle use  Eyes- shutting her eyes but able to be pried and then moves in her own volitional directions, not following commands appropriately   CVS: regular rate and rhythm, no murmurs, rubs, or gallops; no blatant jugular venous distention; no extremity edema and extremities are warm to the touch  GI: soft, nontender, BS+, no rebound or guarding, no conspicuous organomegaly   Neuro: has been more interactive starting since 4/16; moves all extremities of their own volition, no weakness noted and tolerated breakfast, resting in chair with with sitter observing; pt has been calm thus far   Psych: flat affect, more responsive, but appears overall a bit withdrawn though remains more conversant with prompting and cooperative, otherwise difficult to further assess     Medical Decision Making       45 MINUTES SPENT BY ME on the date of service doing chart review, history, exam, documentation & further activities per the note.   Discussed with the bedside nurse, health unit coordinator, and , and sitter again      Data         Imaging results reviewed over the past 24 hrs:   No results found for this or any previous visit (from the past 24 hour(s)).

## 2023-04-18 NOTE — PROGRESS NOTES
Care Management Follow Up    Length of Stay (days): 5    Expected Discharge Date: 04/20/2023     Concerns to be Addressed:     Discharge planning.    Additional Information:  CM left message for facility with update. Patient seen by Psych provider today, some minor med adjustments will be made. Hopeful she will return back tomorrow to her facility at discharge. Family to provide ride? TBD.      Liana Guerrero RN    330pm: spoke with Dtr. Gave an update. She will be providing transport at discharge.

## 2023-04-18 NOTE — PLAN OF CARE
Problem: Plan of Care - These are the overarching goals to be used throughout the patient stay.    Goal: Optimal Comfort and Wellbeing  Outcome: Progressing  Intervention: Monitor Pain and Promote Comfort  Recent Flowsheet Documentation  Taken 4/17/2023 1654 by Thuy Omalley RN  Pain Management Interventions: declines     Problem: Risk for Delirium  Goal: Optimal Coping  Outcome: Progressing     Problem: Risk for Delirium  Goal: Improved Behavioral Control  Outcome: Progressing  Intervention: Minimize Safety Risk  Recent Flowsheet Documentation  Taken 4/17/2023 1654 by Thuy Omalley RN  Enhanced Safety Measures:  at bedside   Goal Outcome Evaluation:       Patient Alert but confused.staff will continue to re-orientate, VVS, patient continued to be agitated and restless throughout shift, managed with scheduled medication, redirection, and ambulation. PIV access lost d/t leak and infiltration. Pills crushed in apple sauce. Low fat diet and 0 caffeine. SBA. Expected discharge to go back to memory care.

## 2023-04-18 NOTE — PLAN OF CARE
Problem: Suicide Risk  Goal: Absence of Self-Harm  Outcome: Progressing     Problem: Electrolyte Imbalance  Goal: Electrolyte Imbalance: Plan of Care  Outcome: Progressing   Pt resting on NOC shift. Disoriented x4, not very conversational overnight. Redirected easily by 1:1 sitter. No PRNs required. Difficulty swallowing in AM, wouldn't swallow depakote. Kept pocketing pill/spitting out. Standby assist.
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
## 2023-04-18 NOTE — PLAN OF CARE
Problem: Risk for Delirium  Goal: Optimal Coping  Outcome: Progressing  Goal Outcome Evaluation: Per dementia(baseline) assessment, Patient is oriented only to self, intermittent fluctuations, ambulated a lot most of this shift in the room, bathroom, outside the room, and on the hallways with 1:1 sitter using a walker, gaitbelt, and wheelchair (when observed got tired from walking), no pain using PAINAD Pain assessment, VAA, afebrile, on room air, ate with good appetite, took medications crushed and mixed with applesauce, noted no swallowing difficulty issues. Patient is back to bed noted sound asleep with a 1:1 sitter. Will continue to monitor. -Juany CARLIN RN

## 2023-04-19 NOTE — PLAN OF CARE
Problem: Plan of Care - These are the overarching goals to be used throughout the patient stay.    Goal: Readiness for Transition of Care  Outcome: Progressing   Goal Outcome Evaluation:                      Disoriented x 4. Calm and sleeping throughout the night shift. No prns needed. Interventions for sleep regulation that were effective included dark room, minimizing noise, quiet music, and reorienting pt to time of day. Possible discharge back to memory care today per care manager note.

## 2023-04-19 NOTE — PROGRESS NOTES
Florencia was sleeping at the time of the visit and was being prepared for discharge. No further spiritual care needs at this time.     Chaplain EREN Mccain, Ph.D., [facility]    Spiritual Health Services available 24/7 for emergency requests/referrals, either by having the on-call  paged or by entering an ASAP/STAT consult in Epic (this will also page the on-call ).

## 2023-04-19 NOTE — DISCHARGE SUMMARY
[facility]  Hospitalist Discharge Summary      Date of Admission:  4/12/2023  Date of Discharge:  4/19/2023  Discharging Provider: Alexis Ramos MD  Discharge Service: Hospitalist Service    Discharge Diagnoses   Alzheimer's disease  Status post fall  Nasal fracture  Hypernatremia, resolved  Acute kidney injury, resolved  Macrocytic anemia, stable    Follow-ups Needed After Discharge        Unresulted Labs Ordered in the Past 30 Days of this Admission     No orders found from 3/13/2023 to 4/13/2023.      These results will be followed up by [facility]    Discharge Disposition   Discharged to home, which is a memory care facility  Condition at discharge: [facility] Course   Beth Ng is a 73 year old female who presented with complaints of weakness, fall, facial injury. Medical history is notable for dementia, paranoia, heart failure, hypertension, breast cancer. Initial evaluation revealed unremarkable vital signs, labs notable for sodium 155, creatinine 1.8, hemoglobin 11.6, . CT facial bones with nasal bone fractures, CT head without contrast negative for bleed.    Chart review:   --1/18/2023 visit with psychiatry: Hydroxyzine was stopped, started on Seroquel, buspirone 50 mg 4 times daily, citalopram 10 mg twice daily, donepezil, and plan was to follow-up  --Last hospitalization with telepsychiatry recommendations:buspirone increased to 10 mg 3 times daily, Seroquel and Risperdal were stopped, trazodone increased to 50 mg at nighttime    On hospital day 1 sodium continued to downtrend towards improvement at 150; on 4/14 the sodium normalized at 145. Urinalysis was checked which was not-infectious appearing.     While hospitalized here, she received a decreased dose of trazodone given family reports of behavior changes after it was recently increased -her other psychiatry meds were continued at the usual dose.  There was PRN Haldol available; psychiatry consulted and initial  triage assessment on 4/17 with subsequent pscyhiatry visit on 4/18.    Psychiatry felt that the patient was stable on the current regimen.  As such, the patient will be discharged on 4/19 back to her home memory care facility with a few tablets of Haldol available for potential agitation, otherwise the primary medication change is trazodone was decreased back down to 25 mg once at nighttime.  She will be continued with the donepezil 10 mg at bedtime, lexapro 10mg daily, and the olanzapine 7.5 mg at bedtime with olanzapine 5 mg twice daily during the daytime and ongoing Depakote 250 mg in morning and 500 mg at night time and pta buspar; on discharge, she remains in stable medical condition and is hemodynamically and vitally stable.  Patient should follow-up with her PCP or facility provider within a week for a posthospitalization visit with psychiatry follow-up longitudinally. Finally, she should follow-up with ENT as well.      Consultations This Hospital Stay   CARE MANAGEMENT / SOCIAL WORK IP CONSULT  PSYCHIATRY IP CONSULT  PHYSICAL THERAPY ADULT IP CONSULT  OCCUPATIONAL THERAPY ADULT IP CONSULT    Code Status   No CPR- Do NOT Intubate    Time Spent on this Encounter   I, Alexis Ramos MD, personally saw the patient today and spent greater than 30 minutes discharging this patient.       Alexis Ramos MD  [address] [address]  Phone: [phone]  Fax: [phone]  ______________________________________________________________________    Physical Exam   Vital Signs: Temp: 97  F (36.1  C) Temp src: Axillary BP: (!) 164/69 Pulse: 68   Resp: 18 SpO2: 94 % O2 Device: None (Room air)    Weight: 120 lbs 12.99 oz      General: calm, responds to questions occasionally appropriately but not consistently; resting in bed   Pulmonary: clear to auscultation bilaterally, normal respiratory effort, on room air, no rales or wheezes or evidence of  accessory muscle use  CVS: regular rate and rhythm, no murmurs, rubs, or gallops; no blatant jugular venous distention; no extremity edema and extremities are warm to the touch  GI: soft, nontender, BS+, no rebound or guarding, no conspicuous organomegaly   Neuro: has been more interactive starting since 4/16; moves all extremities of their own volition, no weakness noted; responds to questions occasionally appropriately but not consistently  Psych: flat affect, mostly appropriately responsive, but appears overall a bit withdrawn though remains conversant with prompting and remains cooperative; difficult to further assess      Primary Care Physician   Jeovanny Herbert    Discharge Orders   No discharge procedures on file.    Significant Results and Procedures   Results for orders placed or performed during the hospital encounter of 04/12/23   CT Facial Bones without Contrast    Narrative    EXAM: CT HEAD W/O CONTRAST, CT FACIAL BONES WITHOUT CONTRAST  LOCATION: [facility]  DATE/TIME: 4/12/2023 11:54 PM CDT    INDICATION: Fall; facial trauma.  COMPARISON: 03/07/2023 head CT.  TECHNIQUE:   1) Routine CT Head without IV contrast. Multiplanar reformats. Dose reduction techniques were used.  2) Routine CT Facial Bones without IV contrast. Multiplanar reformats. Dose reduction techniques were used.    FINDINGS:  HEAD CT:   INTRACRANIAL CONTENTS: No intracranial hemorrhage, extraaxial collection, or mass effect. No CT evidence of acute infarct. Mild volume loss and presumed chronic small vessel ischemia are stable.    OSSEOUS STRUCTURES/SOFT TISSUES: No calvarial fracture.    FACIAL BONE CT:  OSSEOUS STRUCTURES/SOFT TISSUES: Soft tissue swelling over the nose. Acute mildly comminuted and slightly angled fracture of the right anterior nasal arch. Subtle buckled fracture of the left anterior nasal arch. No other fracture. No evidence for dental   trauma or periapical abscess.    ORBITAL CONTENTS:  No acute abnormality.    SINUSES: No paranasal sinus mucosal disease.      Impression    IMPRESSION:  HEAD CT:  1. No acute intracranial abnormality or significant change compared to the prior study.    FACIAL BONE CT:  1.  Acute nasal bone fractures.     CT Head w/o Contrast    Narrative    EXAM: CT HEAD W/O CONTRAST, CT FACIAL BONES WITHOUT CONTRAST  LOCATION: [facility]  DATE/TIME: 4/12/2023 11:54 PM CDT    INDICATION: Fall; facial trauma.  COMPARISON: 03/07/2023 head CT.  TECHNIQUE:   1) Routine CT Head without IV contrast. Multiplanar reformats. Dose reduction techniques were used.  2) Routine CT Facial Bones without IV contrast. Multiplanar reformats. Dose reduction techniques were used.    FINDINGS:  HEAD CT:   INTRACRANIAL CONTENTS: No intracranial hemorrhage, extraaxial collection, or mass effect. No CT evidence of acute infarct. Mild volume loss and presumed chronic small vessel ischemia are stable.    OSSEOUS STRUCTURES/SOFT TISSUES: No calvarial fracture.    FACIAL BONE CT:  OSSEOUS STRUCTURES/SOFT TISSUES: Soft tissue swelling over the nose. Acute mildly comminuted and slightly angled fracture of the right anterior nasal arch. Subtle buckled fracture of the left anterior nasal arch. No other fracture. No evidence for dental   trauma or periapical abscess.    ORBITAL CONTENTS: No acute abnormality.    SINUSES: No paranasal sinus mucosal disease.      Impression    IMPRESSION:  HEAD CT:  1. No acute intracranial abnormality or significant change compared to the prior study.    FACIAL BONE CT:  1.  Acute nasal bone fractures.       Discharge Medications   Current Discharge Medication List      CONTINUE these medications which have NOT CHANGED    Details   busPIRone (BUSPAR) 10 MG tablet Take 10 mg by mouth 3 times daily      cloNIDine (CATAPRES) 0.1 MG tablet Take 0.1 mg by mouth 2 times daily      !! divalproex sodium delayed-release (DEPAKOTE) 125 MG DR tablet Take 250 mg by  mouth every morning      !! divalproex sodium delayed-release (DEPAKOTE) 125 MG DR tablet Take 500 mg by mouth At Bedtime      donepezil (ARICEPT) 10 MG tablet Take 10 mg by mouth At Bedtime      escitalopram (LEXAPRO) 10 MG tablet Take 10 mg by mouth every morning      furosemide (LASIX) 40 MG tablet Take 40 mg by mouth every morning      gabapentin (NEURONTIN) 100 MG capsule Take 100 mg by mouth 3 times daily      lisinopril (ZESTRIL) 40 MG tablet Take 40 mg by mouth daily      melatonin 5 MG tablet Take 5 mg by mouth At Bedtime      memantine (NAMENDA) 5 MG tablet Take 5 mg by mouth 2 times daily      !! OLANZapine (ZYPREXA) 5 MG tablet Take 5 mg by mouth 2 times daily      !! OLANZapine (ZYPREXA) 5 MG tablet Take 2.5-5 mg by mouth every 8 hours as needed      !! OLANZapine (ZYPREXA) 7.5 MG tablet Take 7.5 mg by mouth At Bedtime      !! traZODone (DESYREL) 50 MG tablet Take 50 mg by mouth At Bedtime      !! traZODone (DESYREL) 50 MG tablet Take 50 mg by mouth 2 times daily as needed for sleep       !! - Potential duplicate medications found. Please discuss with provider.        Allergies   Allergies   Allergen Reactions     Amlodipine Swelling     Edema     Pravastatin Muscle Pain (Myalgia)     Simvastatin Muscle Pain (Myalgia)

## 2023-04-19 NOTE — PROGRESS NOTES
Care Management Follow Up    Length of Stay (days): 6    Expected Discharge Date: 04/19/2023     Concerns to be Addressed:     Discharge planning    Additional Information:  Patient ready for discharge. Spoke with facility this am, and they said I need to talk to the DON, called back, unable to reach anyone. (she may need a reassessment???)Waiting to get approval for her return today. Daughter will transport her. Will update as soon as I know something.      Liana Guerrero RN

## 2023-04-19 NOTE — PLAN OF CARE
Problem: Plan of Care - These are the overarching goals to be used throughout the patient stay.    Goal: Absence of Hospital-Acquired Illness or Injury  Outcome: Progressing  Intervention: Identify and Manage Fall Risk  Recent Flowsheet Documentation  Taken 4/18/2023 1737 by Thuy Omalley RN  Safety Promotion/Fall Prevention:    activity supervised    assistive device/personal items within reach    bedside attendant    nonskid shoes/slippers when out of bed    room door open    supervised activity     Problem: Risk for Delirium  Goal: Improved Behavioral Control  Outcome: Progressing  Intervention: Prevent and Manage Agitation  Recent Flowsheet Documentation  Taken 4/18/2023 1737 by Thuy Omalley RN  Environment Familiarity/Consistency: (sitter at bedside) other (see comments)  Intervention: Minimize Safety Risk  Recent Flowsheet Documentation  Taken 4/18/2023 1737 by Thuy Omalley RN  Enhanced Safety Measures:  at bedside     Problem: Risk for Delirium  Goal: Optimal Coping  Outcome: Progressing     Problem: Risk for Delirium  Goal: Improved Attention and Thought Clarity  Intervention: Maximize Cognitive Function  Recent Flowsheet Documentation  Taken 4/18/2023 1737 by Thuy Omalley RN  Sensory Stimulation Regulation:    music on    quiet environment promoted  Reorientation Measures:    clock in view    reorientation provided   Goal Outcome Evaluation:    Patient is disoriented and agitated.agitation relieved with scheduled medication and ambulation. 1:1 sitter. VVS, PIV SL,  pills crushed in applesauce, low fat diet with 0 caffeine. SB assist.

## 2023-04-20 NOTE — PROGRESS NOTES
[facility]    Background: Transitional Care Management program identified per system criteria and reviewed by [facility] team for possible outreach.    Assessment: Upon chart review, [facility] Team member will not proceed with patient outreach related to this episode of Transitional Care Management program due to reason below:    Patient has discharged to a Memory Care, Long-term Care, Assisted Living or Group Home where patient is receiving on-site support with their daily cares, including support with hospital follow up plan.    Plan: Transitional Care Management episode addressed appropriately per reason noted above.      Mely Hanson MA  [facility], [facility]    *Connected Care Resource Team does NOT follow patient ongoing. Referrals are identified based on internal discharge reports and the outreach is to ensure patient has an understanding of their discharge instructions.